# Patient Record
Sex: MALE | Race: WHITE | URBAN - METROPOLITAN AREA
[De-identification: names, ages, dates, MRNs, and addresses within clinical notes are randomized per-mention and may not be internally consistent; named-entity substitution may affect disease eponyms.]

---

## 2023-04-26 ENCOUNTER — TELEPHONE (OUTPATIENT)
Dept: PRIMARY CARE | Facility: CLINIC | Age: 68
End: 2023-04-26
Payer: MEDICARE

## 2023-06-18 DIAGNOSIS — E78.5 DYSLIPIDEMIA: Primary | ICD-10-CM

## 2023-06-19 RX ORDER — ATORVASTATIN CALCIUM 20 MG/1
TABLET, FILM COATED ORAL
Qty: 90 TABLET | Refills: 0 | Status: SHIPPED | OUTPATIENT
Start: 2023-06-19 | End: 2023-07-31 | Stop reason: SDUPTHER

## 2023-07-14 PROBLEM — D72.819 LEUKOPENIA: Status: ACTIVE | Noted: 2023-07-14

## 2023-07-14 PROBLEM — D22.9 MULTIPLE NEVI: Status: ACTIVE | Noted: 2023-07-14

## 2023-07-14 PROBLEM — J01.90 ACUTE SINUSITIS: Status: ACTIVE | Noted: 2023-07-14

## 2023-07-14 PROBLEM — K50.00 ILEITIS, TERMINAL (MULTI): Status: ACTIVE | Noted: 2023-07-14

## 2023-07-14 PROBLEM — E78.00 ELEVATED LDL CHOLESTEROL LEVEL: Status: ACTIVE | Noted: 2023-07-14

## 2023-07-14 PROBLEM — M54.50 ACUTE LEFT-SIDED LOW BACK PAIN WITHOUT SCIATICA: Status: ACTIVE | Noted: 2023-07-14

## 2023-07-14 PROBLEM — L25.9 CONTACT DERMATITIS: Status: ACTIVE | Noted: 2023-07-14

## 2023-07-14 PROBLEM — E78.5 DYSLIPIDEMIA: Status: ACTIVE | Noted: 2023-07-14

## 2023-07-14 PROBLEM — R10.32 COLICKY LLQ ABDOMINAL PAIN: Status: ACTIVE | Noted: 2023-07-14

## 2023-07-14 PROBLEM — R53.83 OTHER FATIGUE: Status: ACTIVE | Noted: 2023-07-14

## 2023-07-14 PROBLEM — K59.00 CONSTIPATION: Status: ACTIVE | Noted: 2023-07-14

## 2023-07-14 PROBLEM — M54.31 SCIATICA OF RIGHT SIDE: Status: ACTIVE | Noted: 2023-07-14

## 2023-07-14 PROBLEM — R73.01 IFG (IMPAIRED FASTING GLUCOSE): Status: ACTIVE | Noted: 2023-07-14

## 2023-07-14 PROBLEM — K52.9 ILEITIS: Status: ACTIVE | Noted: 2023-07-14

## 2023-07-14 PROBLEM — K42.9 UMBILICAL HERNIA: Status: ACTIVE | Noted: 2023-07-14

## 2023-07-14 RX ORDER — TADALAFIL 20 MG/1
TABLET ORAL
COMMUNITY
Start: 2023-02-22 | End: 2023-07-31 | Stop reason: SDUPTHER

## 2023-07-18 ENCOUNTER — OFFICE VISIT (OUTPATIENT)
Dept: PRIMARY CARE | Facility: CLINIC | Age: 68
End: 2023-07-18
Payer: MEDICARE

## 2023-07-18 VITALS
BODY MASS INDEX: 24.92 KG/M2 | HEART RATE: 68 BPM | OXYGEN SATURATION: 99 % | WEIGHT: 178 LBS | RESPIRATION RATE: 12 BRPM | HEIGHT: 71 IN | DIASTOLIC BLOOD PRESSURE: 84 MMHG | SYSTOLIC BLOOD PRESSURE: 124 MMHG

## 2023-07-18 DIAGNOSIS — L24.7 IRRITANT CONTACT DERMATITIS DUE TO PLANTS, EXCEPT FOOD: Primary | ICD-10-CM

## 2023-07-18 PROCEDURE — 99213 OFFICE O/P EST LOW 20 MIN: CPT | Performed by: FAMILY MEDICINE

## 2023-07-18 PROCEDURE — 1159F MED LIST DOCD IN RCRD: CPT | Performed by: FAMILY MEDICINE

## 2023-07-18 PROCEDURE — 1036F TOBACCO NON-USER: CPT | Performed by: FAMILY MEDICINE

## 2023-07-18 PROCEDURE — 96372 THER/PROPH/DIAG INJ SC/IM: CPT | Performed by: FAMILY MEDICINE

## 2023-07-18 RX ORDER — METHYLPREDNISOLONE ACETATE 40 MG/ML
40 INJECTION, SUSPENSION INTRA-ARTICULAR; INTRALESIONAL; INTRAMUSCULAR; SOFT TISSUE ONCE
Status: DISCONTINUED | OUTPATIENT
Start: 2023-07-18 | End: 2023-07-18

## 2023-07-18 RX ORDER — METHYLPREDNISOLONE 4 MG/1
TABLET ORAL
Qty: 21 TABLET | Refills: 0 | Status: SHIPPED | OUTPATIENT
Start: 2023-07-18 | End: 2023-07-25

## 2023-07-18 RX ORDER — METHYLPREDNISOLONE ACETATE 80 MG/ML
80 INJECTION, SUSPENSION INTRA-ARTICULAR; INTRALESIONAL; INTRAMUSCULAR; SOFT TISSUE ONCE
Status: COMPLETED | OUTPATIENT
Start: 2023-07-18 | End: 2023-07-18

## 2023-07-18 RX ADMIN — METHYLPREDNISOLONE ACETATE 40 MG: 80 INJECTION, SUSPENSION INTRA-ARTICULAR; INTRALESIONAL; INTRAMUSCULAR; SOFT TISSUE at 15:09

## 2023-07-18 ASSESSMENT — ENCOUNTER SYMPTOMS
DEPRESSION: 0
SORE THROAT: 0
DYSURIA: 0
COUGH: 0
HEADACHES: 0
ABDOMINAL DISTENTION: 0
EYE REDNESS: 0
EYE PAIN: 0
OCCASIONAL FEELINGS OF UNSTEADINESS: 0
SHORTNESS OF BREATH: 0
ADENOPATHY: 0
CONSTIPATION: 0
WEAKNESS: 0
NERVOUS/ANXIOUS: 0
CHILLS: 0
BACK PAIN: 0
FEVER: 0
DIZZINESS: 0
DYSPHORIC MOOD: 0
DIARRHEA: 0
FATIGUE: 0
BLOOD IN STOOL: 0
ABDOMINAL PAIN: 0
CHEST TIGHTNESS: 0
APPETITE CHANGE: 0
ARTHRALGIAS: 0
LOSS OF SENSATION IN FEET: 0
BRUISES/BLEEDS EASILY: 0
DIFFICULTY URINATING: 0

## 2023-07-18 NOTE — PROGRESS NOTES
"Subjective   Patient ID: Zacarias Holbrook is a 68 y.o. male who presents for Rash.    HPI     Review of Systems    Objective   /84   Pulse 68   Resp 12   Ht 1.791 m (5' 10.5\")   Wt 80.7 kg (178 lb)   SpO2 99%   BMI 25.18 kg/m²     Physical Exam    Assessment/Plan          "

## 2023-07-18 NOTE — PROGRESS NOTES
"Subjective   Patient ID: Zacarias Holbrook is a 68 y.o. male who presents for Rash.  Pt has itchy patchy raised rash for about 5 days after working outside and thinks he may have had contact with poison ivy . Started on arms and has spread to trunk and legs. Pt reports symptoms are worsening.   Pt has tried Calamine lotion for treatment with some improvement.     Rash  Pertinent negatives include no congestion, cough, diarrhea, eye pain, fatigue, fever, shortness of breath or sore throat.       Review of Systems   Constitutional:  Negative for appetite change, chills, fatigue and fever.   HENT:  Negative for congestion, hearing loss and sore throat.    Eyes:  Negative for pain, redness and visual disturbance.   Respiratory:  Negative for cough, chest tightness and shortness of breath.    Cardiovascular:  Negative for chest pain and leg swelling.   Gastrointestinal:  Negative for abdominal distention, abdominal pain, blood in stool, constipation and diarrhea.   Genitourinary:  Negative for difficulty urinating and dysuria.   Musculoskeletal:  Negative for arthralgias and back pain.   Skin:  Positive for rash.   Neurological:  Negative for dizziness, weakness and headaches.   Hematological:  Negative for adenopathy. Does not bruise/bleed easily.   Psychiatric/Behavioral:  Negative for dysphoric mood. The patient is not nervous/anxious.        Objective   /84   Pulse 68   Resp 12   Ht 1.791 m (5' 10.5\")   Wt 80.7 kg (178 lb)   SpO2 99%   BMI 25.18 kg/m²    Physical Exam  Constitutional:       General: He is not in acute distress.     Appearance: Normal appearance.   Cardiovascular:      Rate and Rhythm: Normal rate and regular rhythm.      Heart sounds: Normal heart sounds. No murmur heard.  Pulmonary:      Effort: Pulmonary effort is normal.      Breath sounds: Normal breath sounds.   Abdominal:      Palpations: Abdomen is soft.      Tenderness: There is no abdominal tenderness.   Skin:     Findings: Rash " (scattered gabriela raised pink rash on arms, legs and R chest, +excoriations) present.   Neurological:      Mental Status: He is alert.   Psychiatric:         Mood and Affect: Mood normal.         Judgment: Judgment normal.           Assessment/Plan     Contract dermatitis - with diffuse coverage will treat with steroid shot and Medrol dose lucas starting tomorrow. Continue Benadryl and Calamine lotion for itchiness as needed  Follow up as planned

## 2023-07-26 ENCOUNTER — TELEPHONE (OUTPATIENT)
Dept: PRIMARY CARE | Facility: CLINIC | Age: 68
End: 2023-07-26
Payer: MEDICARE

## 2023-07-27 DIAGNOSIS — E78.5 DYSLIPIDEMIA: Primary | ICD-10-CM

## 2023-07-28 ENCOUNTER — LAB (OUTPATIENT)
Dept: LAB | Facility: LAB | Age: 68
End: 2023-07-28
Payer: MEDICARE

## 2023-07-28 DIAGNOSIS — E78.5 DYSLIPIDEMIA: ICD-10-CM

## 2023-07-28 LAB
ALANINE AMINOTRANSFERASE (SGPT) (U/L) IN SER/PLAS: 15 U/L (ref 10–52)
ALBUMIN (G/DL) IN SER/PLAS: 4.5 G/DL (ref 3.4–5)
ALKALINE PHOSPHATASE (U/L) IN SER/PLAS: 66 U/L (ref 33–136)
ANION GAP IN SER/PLAS: 12 MMOL/L (ref 10–20)
ASPARTATE AMINOTRANSFERASE (SGOT) (U/L) IN SER/PLAS: 15 U/L (ref 9–39)
BILIRUBIN TOTAL (MG/DL) IN SER/PLAS: 0.7 MG/DL (ref 0–1.2)
CALCIUM (MG/DL) IN SER/PLAS: 9.6 MG/DL (ref 8.6–10.6)
CARBON DIOXIDE, TOTAL (MMOL/L) IN SER/PLAS: 30 MMOL/L (ref 21–32)
CHLORIDE (MMOL/L) IN SER/PLAS: 104 MMOL/L (ref 98–107)
CHOLESTEROL (MG/DL) IN SER/PLAS: 166 MG/DL (ref 0–199)
CHOLESTEROL IN HDL (MG/DL) IN SER/PLAS: 69.8 MG/DL
CHOLESTEROL/HDL RATIO: 2.4
CREATININE (MG/DL) IN SER/PLAS: 0.92 MG/DL (ref 0.5–1.3)
GFR MALE: >90 ML/MIN/1.73M2
GLUCOSE (MG/DL) IN SER/PLAS: 85 MG/DL (ref 74–99)
LDL: 80 MG/DL (ref 0–99)
POTASSIUM (MMOL/L) IN SER/PLAS: 4.4 MMOL/L (ref 3.5–5.3)
PROTEIN TOTAL: 6.5 G/DL (ref 6.4–8.2)
SODIUM (MMOL/L) IN SER/PLAS: 142 MMOL/L (ref 136–145)
TRIGLYCERIDE (MG/DL) IN SER/PLAS: 80 MG/DL (ref 0–149)
UREA NITROGEN (MG/DL) IN SER/PLAS: 16 MG/DL (ref 6–23)
VLDL: 16 MG/DL (ref 0–40)

## 2023-07-28 PROCEDURE — 36415 COLL VENOUS BLD VENIPUNCTURE: CPT

## 2023-07-28 PROCEDURE — 80053 COMPREHEN METABOLIC PANEL: CPT

## 2023-07-28 PROCEDURE — 80061 LIPID PANEL: CPT

## 2023-07-31 ENCOUNTER — OFFICE VISIT (OUTPATIENT)
Dept: PRIMARY CARE | Facility: CLINIC | Age: 68
End: 2023-07-31
Payer: MEDICARE

## 2023-07-31 ENCOUNTER — TELEPHONE (OUTPATIENT)
Dept: PRIMARY CARE | Facility: CLINIC | Age: 68
End: 2023-07-31

## 2023-07-31 VITALS
RESPIRATION RATE: 12 BRPM | HEART RATE: 72 BPM | HEIGHT: 71 IN | DIASTOLIC BLOOD PRESSURE: 74 MMHG | SYSTOLIC BLOOD PRESSURE: 122 MMHG | BODY MASS INDEX: 24.92 KG/M2 | OXYGEN SATURATION: 97 % | WEIGHT: 178 LBS

## 2023-07-31 DIAGNOSIS — L24.7 IRRITANT CONTACT DERMATITIS DUE TO PLANTS, EXCEPT FOOD: ICD-10-CM

## 2023-07-31 DIAGNOSIS — R73.01 IFG (IMPAIRED FASTING GLUCOSE): ICD-10-CM

## 2023-07-31 DIAGNOSIS — E78.5 DYSLIPIDEMIA: Primary | ICD-10-CM

## 2023-07-31 DIAGNOSIS — N52.9 ERECTILE DYSFUNCTION, UNSPECIFIED ERECTILE DYSFUNCTION TYPE: ICD-10-CM

## 2023-07-31 DIAGNOSIS — Z00.00 HEALTH CARE MAINTENANCE: ICD-10-CM

## 2023-07-31 DIAGNOSIS — L84 CALLUS OF FOOT: ICD-10-CM

## 2023-07-31 PROBLEM — M54.50 ACUTE LEFT-SIDED LOW BACK PAIN WITHOUT SCIATICA: Status: RESOLVED | Noted: 2023-07-14 | Resolved: 2023-07-31

## 2023-07-31 PROBLEM — R20.2 PARESTHESIAS: Status: ACTIVE | Noted: 2023-07-31

## 2023-07-31 PROBLEM — K59.00 CONSTIPATION: Status: RESOLVED | Noted: 2023-07-14 | Resolved: 2023-07-31

## 2023-07-31 PROBLEM — N48.6 PEYRONIE DISEASE: Status: ACTIVE | Noted: 2023-07-31

## 2023-07-31 PROBLEM — J01.90 ACUTE SINUSITIS: Status: RESOLVED | Noted: 2023-07-14 | Resolved: 2023-07-31

## 2023-07-31 PROBLEM — R10.32 COLICKY LLQ ABDOMINAL PAIN: Status: RESOLVED | Noted: 2023-07-14 | Resolved: 2023-07-31

## 2023-07-31 PROBLEM — E78.00 ELEVATED LDL CHOLESTEROL LEVEL: Status: RESOLVED | Noted: 2023-07-14 | Resolved: 2023-07-31

## 2023-07-31 PROCEDURE — 99213 OFFICE O/P EST LOW 20 MIN: CPT | Performed by: FAMILY MEDICINE

## 2023-07-31 PROCEDURE — 1159F MED LIST DOCD IN RCRD: CPT | Performed by: FAMILY MEDICINE

## 2023-07-31 PROCEDURE — 1036F TOBACCO NON-USER: CPT | Performed by: FAMILY MEDICINE

## 2023-07-31 RX ORDER — ATORVASTATIN CALCIUM 20 MG/1
20 TABLET, FILM COATED ORAL DAILY
Qty: 90 TABLET | Refills: 1 | Status: SHIPPED | OUTPATIENT
Start: 2023-07-31 | End: 2024-02-02 | Stop reason: SDUPTHER

## 2023-07-31 RX ORDER — TADALAFIL 20 MG/1
20 TABLET ORAL DAILY PRN
Qty: 30 TABLET | Refills: 1 | Status: SHIPPED | OUTPATIENT
Start: 2023-07-31

## 2023-07-31 RX ORDER — TRIAMCINOLONE ACETONIDE 1 MG/G
CREAM TOPICAL 2 TIMES DAILY
Qty: 30 G | Refills: 0 | Status: SHIPPED | OUTPATIENT
Start: 2023-07-31 | End: 2024-02-02 | Stop reason: WASHOUT

## 2023-07-31 ASSESSMENT — ENCOUNTER SYMPTOMS
DYSPHORIC MOOD: 0
NERVOUS/ANXIOUS: 0
DYSURIA: 0
BLOOD IN STOOL: 0
SHORTNESS OF BREATH: 0
BACK PAIN: 0
ABDOMINAL PAIN: 0
ABDOMINAL DISTENTION: 0
FATIGUE: 0
DIFFICULTY URINATING: 0
EYE REDNESS: 0
APPETITE CHANGE: 0
DIARRHEA: 0
HEADACHES: 0
ARTHRALGIAS: 0
CHEST TIGHTNESS: 0
SORE THROAT: 0
ADENOPATHY: 0
DIZZINESS: 0
CONSTIPATION: 0
COUGH: 0
EYE PAIN: 0
WEAKNESS: 0
BRUISES/BLEEDS EASILY: 0
CHILLS: 0
FEVER: 0

## 2023-07-31 NOTE — PROGRESS NOTES
"Subjective   Patient ID: Zacarias Holbrook is a 68 y.o. male who presents for Follow-up.    HPI     Review of Systems    Objective   /74   Pulse 72   Resp 12   Ht 1.791 m (5' 10.5\")   Wt 80.7 kg (178 lb)   SpO2 97%   BMI 25.18 kg/m²     Physical Exam    Assessment/Plan          "

## 2023-07-31 NOTE — PROGRESS NOTES
"Subjective   Patient ID: Zacarias Holbrook is a 68 y.o. male who presents for Follow-up.  Pt has Dyslipidemia.   Lipid panel showed LDL in good range.  Currently taking Atorvastatin and is tolerating well without muscle pains or weakness.   Exercising 6 days per week. Usually eating healthy    He still has poison ivy dermatitis on arms and hands. He finished Medrol dose lucas.     He brings up L lateral plantar foot tenderness with walking.        Review of Systems   Constitutional:  Negative for appetite change, chills, fatigue and fever.   HENT:  Negative for congestion, hearing loss and sore throat.    Eyes:  Negative for pain, redness and visual disturbance.   Respiratory:  Negative for cough, chest tightness and shortness of breath.    Cardiovascular:  Negative for chest pain and leg swelling.   Gastrointestinal:  Negative for abdominal distention, abdominal pain, blood in stool, constipation and diarrhea.   Genitourinary:  Negative for difficulty urinating and dysuria.   Musculoskeletal:  Negative for arthralgias and back pain.   Skin:  Negative for rash.   Neurological:  Negative for dizziness, weakness and headaches.   Hematological:  Negative for adenopathy. Does not bruise/bleed easily.   Psychiatric/Behavioral:  Negative for dysphoric mood. The patient is not nervous/anxious.        Objective   /74   Pulse 72   Resp 12   Ht 1.791 m (5' 10.5\")   Wt 80.7 kg (178 lb)   SpO2 97%   BMI 25.18 kg/m²    Physical Exam  Constitutional:       General: He is not in acute distress.     Appearance: Normal appearance.   Cardiovascular:      Rate and Rhythm: Normal rate and regular rhythm.      Heart sounds: Normal heart sounds. No murmur heard.  Pulmonary:      Effort: Pulmonary effort is normal.      Breath sounds: Normal breath sounds.   Abdominal:      Palpations: Abdomen is soft.      Tenderness: There is no abdominal tenderness.   Skin:     Comments: L lateral plantar foot has mildly tender callus. L hand R " upper arm have scattered maculopapular rash.   Neurological:      Mental Status: He is alert.   Psychiatric:         Mood and Affect: Mood normal.         Judgment: Judgment normal.           Assessment/Plan   Cholesterol - doing well on statin, continue low cholesterol diet and regular exercise. We will continue to monitor with routine labs   IFG - fasting glucose improved , monitor  Poison Ivy dermatitis- giving Kenalog cream to use as needed  Callus on L foot - discussed soaking and filing.  Follow up in 6months, 30min for physical

## 2023-08-04 DIAGNOSIS — L03.119 CELLULITIS OF LOWER EXTREMITY, UNSPECIFIED LATERALITY: Primary | ICD-10-CM

## 2023-08-04 RX ORDER — SULFAMETHOXAZOLE AND TRIMETHOPRIM 800; 160 MG/1; MG/1
1 TABLET ORAL 2 TIMES DAILY
Qty: 20 TABLET | Refills: 0 | Status: SHIPPED | OUTPATIENT
Start: 2023-08-04 | End: 2023-08-14

## 2023-12-09 DIAGNOSIS — E78.5 DYSLIPIDEMIA: ICD-10-CM

## 2023-12-29 RX ORDER — ATORVASTATIN CALCIUM 20 MG/1
20 TABLET, FILM COATED ORAL DAILY
Qty: 100 TABLET | Refills: 2 | OUTPATIENT
Start: 2023-12-29

## 2024-01-24 ENCOUNTER — LAB (OUTPATIENT)
Dept: LAB | Facility: LAB | Age: 69
End: 2024-01-24
Payer: MEDICARE

## 2024-01-24 DIAGNOSIS — Z00.00 HEALTH CARE MAINTENANCE: ICD-10-CM

## 2024-01-24 DIAGNOSIS — E78.5 DYSLIPIDEMIA: ICD-10-CM

## 2024-01-24 DIAGNOSIS — R73.01 IFG (IMPAIRED FASTING GLUCOSE): ICD-10-CM

## 2024-01-24 LAB
ALBUMIN SERPL BCP-MCNC: 4 G/DL (ref 3.4–5)
ALP SERPL-CCNC: 63 U/L (ref 33–136)
ALT SERPL W P-5'-P-CCNC: 22 U/L (ref 10–52)
ANION GAP SERPL CALC-SCNC: 11 MMOL/L (ref 10–20)
APPEARANCE UR: ABNORMAL
AST SERPL W P-5'-P-CCNC: 20 U/L (ref 9–39)
BILIRUB SERPL-MCNC: 0.6 MG/DL (ref 0–1.2)
BILIRUB UR STRIP.AUTO-MCNC: NEGATIVE MG/DL
BUN SERPL-MCNC: 15 MG/DL (ref 6–23)
CALCIUM SERPL-MCNC: 9.1 MG/DL (ref 8.6–10.6)
CHLORIDE SERPL-SCNC: 104 MMOL/L (ref 98–107)
CHOLEST SERPL-MCNC: 141 MG/DL (ref 0–199)
CHOLESTEROL/HDL RATIO: 2.7
CO2 SERPL-SCNC: 29 MMOL/L (ref 21–32)
COLOR UR: ABNORMAL
CREAT SERPL-MCNC: 1.01 MG/DL (ref 0.5–1.3)
EGFRCR SERPLBLD CKD-EPI 2021: 82 ML/MIN/1.73M*2
ERYTHROCYTE [DISTWIDTH] IN BLOOD BY AUTOMATED COUNT: 12.4 % (ref 11.5–14.5)
GLUCOSE SERPL-MCNC: 82 MG/DL (ref 74–99)
GLUCOSE UR STRIP.AUTO-MCNC: NEGATIVE MG/DL
HCT VFR BLD AUTO: 41.3 % (ref 41–52)
HCV AB SER QL: NONREACTIVE
HDLC SERPL-MCNC: 51.9 MG/DL
HGB BLD-MCNC: 13.7 G/DL (ref 13.5–17.5)
KETONES UR STRIP.AUTO-MCNC: NEGATIVE MG/DL
LDLC SERPL CALC-MCNC: 70 MG/DL
LEUKOCYTE ESTERASE UR QL STRIP.AUTO: NEGATIVE
MCH RBC QN AUTO: 30.4 PG (ref 26–34)
MCHC RBC AUTO-ENTMCNC: 33.2 G/DL (ref 32–36)
MCV RBC AUTO: 92 FL (ref 80–100)
NITRITE UR QL STRIP.AUTO: NEGATIVE
NON HDL CHOLESTEROL: 89 MG/DL (ref 0–149)
NRBC BLD-RTO: 0 /100 WBCS (ref 0–0)
PH UR STRIP.AUTO: 5 [PH]
PLATELET # BLD AUTO: 239 X10*3/UL (ref 150–450)
POTASSIUM SERPL-SCNC: 4.4 MMOL/L (ref 3.5–5.3)
PROT SERPL-MCNC: 6 G/DL (ref 6.4–8.2)
PROT UR STRIP.AUTO-MCNC: NEGATIVE MG/DL
PSA SERPL-MCNC: 2.73 NG/ML
RBC # BLD AUTO: 4.51 X10*6/UL (ref 4.5–5.9)
RBC # UR STRIP.AUTO: NEGATIVE /UL
SODIUM SERPL-SCNC: 140 MMOL/L (ref 136–145)
SP GR UR STRIP.AUTO: 1.02
TRIGL SERPL-MCNC: 94 MG/DL (ref 0–149)
UROBILINOGEN UR STRIP.AUTO-MCNC: <2 MG/DL
VLDL: 19 MG/DL (ref 0–40)
WBC # BLD AUTO: 3.3 X10*3/UL (ref 4.4–11.3)

## 2024-01-24 PROCEDURE — 85027 COMPLETE CBC AUTOMATED: CPT

## 2024-01-24 PROCEDURE — 81003 URINALYSIS AUTO W/O SCOPE: CPT

## 2024-01-24 PROCEDURE — 80053 COMPREHEN METABOLIC PANEL: CPT

## 2024-01-24 PROCEDURE — 84153 ASSAY OF PSA TOTAL: CPT

## 2024-01-24 PROCEDURE — 86803 HEPATITIS C AB TEST: CPT

## 2024-01-24 PROCEDURE — 36415 COLL VENOUS BLD VENIPUNCTURE: CPT

## 2024-01-24 PROCEDURE — 80061 LIPID PANEL: CPT

## 2024-01-29 DIAGNOSIS — Z00.00 ROUTINE GENERAL MEDICAL EXAMINATION AT A HEALTH CARE FACILITY: Primary | ICD-10-CM

## 2024-02-02 ENCOUNTER — OFFICE VISIT (OUTPATIENT)
Dept: PRIMARY CARE | Facility: CLINIC | Age: 69
End: 2024-02-02
Payer: MEDICARE

## 2024-02-02 VITALS
WEIGHT: 177 LBS | SYSTOLIC BLOOD PRESSURE: 122 MMHG | DIASTOLIC BLOOD PRESSURE: 78 MMHG | HEIGHT: 70 IN | BODY MASS INDEX: 25.34 KG/M2 | HEART RATE: 72 BPM | OXYGEN SATURATION: 97 % | RESPIRATION RATE: 12 BRPM

## 2024-02-02 DIAGNOSIS — D72.819 LEUKOPENIA, UNSPECIFIED TYPE: ICD-10-CM

## 2024-02-02 DIAGNOSIS — C44.91 RECURRENT BASAL CELL CARCINOMA: ICD-10-CM

## 2024-02-02 DIAGNOSIS — Z00.00 HEALTH CARE MAINTENANCE: Primary | ICD-10-CM

## 2024-02-02 DIAGNOSIS — M54.50 CHRONIC LEFT-SIDED LOW BACK PAIN WITHOUT SCIATICA: ICD-10-CM

## 2024-02-02 DIAGNOSIS — E78.5 DYSLIPIDEMIA: ICD-10-CM

## 2024-02-02 DIAGNOSIS — G89.29 CHRONIC LEFT-SIDED LOW BACK PAIN WITHOUT SCIATICA: ICD-10-CM

## 2024-02-02 PROBLEM — L03.119 CELLULITIS OF LOWER EXTREMITY: Status: RESOLVED | Noted: 2023-08-04 | Resolved: 2024-02-02

## 2024-02-02 PROBLEM — K50.00 ILEITIS, TERMINAL (MULTI): Status: RESOLVED | Noted: 2023-07-14 | Resolved: 2024-02-02

## 2024-02-02 PROBLEM — L25.9 CONTACT DERMATITIS: Status: RESOLVED | Noted: 2023-07-14 | Resolved: 2024-02-02

## 2024-02-02 PROCEDURE — 1160F RVW MEDS BY RX/DR IN RCRD: CPT | Performed by: FAMILY MEDICINE

## 2024-02-02 PROCEDURE — 1159F MED LIST DOCD IN RCRD: CPT | Performed by: FAMILY MEDICINE

## 2024-02-02 PROCEDURE — 1170F FXNL STATUS ASSESSED: CPT | Performed by: FAMILY MEDICINE

## 2024-02-02 PROCEDURE — G0439 PPPS, SUBSEQ VISIT: HCPCS | Performed by: FAMILY MEDICINE

## 2024-02-02 PROCEDURE — 1123F ACP DISCUSS/DSCN MKR DOCD: CPT | Performed by: FAMILY MEDICINE

## 2024-02-02 PROCEDURE — 1036F TOBACCO NON-USER: CPT | Performed by: FAMILY MEDICINE

## 2024-02-02 RX ORDER — ATORVASTATIN CALCIUM 20 MG/1
20 TABLET, FILM COATED ORAL DAILY
Qty: 90 TABLET | Refills: 1 | Status: SHIPPED | OUTPATIENT
Start: 2024-02-02 | End: 2024-06-01

## 2024-02-02 ASSESSMENT — ENCOUNTER SYMPTOMS
DYSPHORIC MOOD: 0
EYE REDNESS: 0
BACK PAIN: 1
LOSS OF SENSATION IN FEET: 0
CHILLS: 0
FEVER: 0
HEADACHES: 0
ABDOMINAL PAIN: 0
CONSTIPATION: 0
CHEST TIGHTNESS: 0
DIARRHEA: 0
EYE PAIN: 0
ABDOMINAL DISTENTION: 0
WEAKNESS: 0
OCCASIONAL FEELINGS OF UNSTEADINESS: 0
DEPRESSION: 0
APPETITE CHANGE: 0
NERVOUS/ANXIOUS: 0
DYSURIA: 0
DIZZINESS: 0
COUGH: 0
FATIGUE: 0
SORE THROAT: 0
BRUISES/BLEEDS EASILY: 0
BLOOD IN STOOL: 0
ARTHRALGIAS: 0
SHORTNESS OF BREATH: 0
ADENOPATHY: 0
DIFFICULTY URINATING: 0

## 2024-02-02 ASSESSMENT — ACTIVITIES OF DAILY LIVING (ADL)
DOING_HOUSEWORK: INDEPENDENT
MANAGING_FINANCES: INDEPENDENT
TAKING_MEDICATION: INDEPENDENT
BATHING: INDEPENDENT
DRESSING: INDEPENDENT
GROCERY_SHOPPING: INDEPENDENT

## 2024-02-02 ASSESSMENT — PATIENT HEALTH QUESTIONNAIRE - PHQ9
2. FEELING DOWN, DEPRESSED OR HOPELESS: NOT AT ALL
1. LITTLE INTEREST OR PLEASURE IN DOING THINGS: NOT AT ALL
SUM OF ALL RESPONSES TO PHQ9 QUESTIONS 1 AND 2: 0

## 2024-02-02 NOTE — PROGRESS NOTES
Subjective   Patient ID: Zacarias Holbrook is a 69 y.o. male who presents for Medicare Annual Wellness Visit Subsequent.  PMHX, PSHx, Fam hx, and Social hx reviewed.   New concerns  - Abcscess improved from infection from Sept but redness in the area remains. Not improving.  Vaccines Flu. Pnemonia, and Shingrix. He has hx BCC, follows with dermatology. He does have new raised round ~4mm skin lesion with erythematous base on R dorsal wrist.  Labs showed mildly low WBC.   Dentist seen at least yearly yes  Vision concerns none  Hearing concerns none  Diet is overall healthy.   Smoker - no  Alcohol use - 2-4 drinks per week  Exercising 6 days per week.   Sexually active - yes, using Tadalafil  Colonoscopy 2022     Pt has Dyslipidemia.   Lipid panel showed LDL 70.  Currently taking lipitor and is tolerating well without muscle pains or weakness.         Review of Systems   Constitutional:  Negative for appetite change, chills, fatigue and fever.   HENT:  Negative for congestion, hearing loss and sore throat.    Eyes:  Negative for pain, redness and visual disturbance.   Respiratory:  Negative for cough, chest tightness and shortness of breath.    Cardiovascular:  Negative for chest pain and leg swelling.   Gastrointestinal:  Negative for abdominal distention, abdominal pain, blood in stool, constipation and diarrhea.   Genitourinary:  Negative for difficulty urinating and dysuria.   Musculoskeletal:  Positive for back pain (L lower back pain, nonradiating. Onset about 6 months ago.). Negative for arthralgias.   Skin:  Negative for rash.   Neurological:  Negative for dizziness, weakness and headaches.   Hematological:  Negative for adenopathy. Does not bruise/bleed easily.   Psychiatric/Behavioral:  Negative for dysphoric mood. The patient is not nervous/anxious.    Medicare Wellness Billing Compliance Satisfied    *This is a visual tool to show completion of required items on the day of the visit. Green checks will only appear  "on the date of visit.    Review all medications by prescribing practitioner or clinical pharmacist (such as prescriptions, OTCs, herbal therapies and supplements) documented in the medical record    Past Medical, Surgical, and Family History reviewed and updated in chart    Tobacco Use Reviewed    Alcohol Use Reviewed    Illicit Drug Use Reviewed    PHQ2/9    Falls in Last Year Reviewed    Home Safety Risk Factors Reviewed    Cognitive Impairment Reviewed    Patient Self Assessment and Health Status    Current Diet Reviewed    Exercise Frequency    ADL - Hearing Impairment    ADL - Bathing    ADL - Dressing    ADL - Walks in Home    IADL - Managing Finances    IADL - Grocery Shopping    IADL - Taking Medications    IADL - Doing Housework        Objective   /78   Pulse 72   Resp 12   Ht 1.778 m (5' 10\")   Wt 80.3 kg (177 lb)   SpO2 97%   BMI 25.40 kg/m²    Physical Exam  Constitutional:       General: He is not in acute distress.     Appearance: Normal appearance. He is not ill-appearing.   HENT:      Head: Normocephalic and atraumatic.      Right Ear: Tympanic membrane, ear canal and external ear normal.      Left Ear: Tympanic membrane, ear canal and external ear normal.      Nose: Nose normal.      Mouth/Throat:      Mouth: Mucous membranes are moist.      Pharynx: No oropharyngeal exudate or posterior oropharyngeal erythema.   Eyes:      Extraocular Movements: Extraocular movements intact.      Conjunctiva/sclera: Conjunctivae normal.      Pupils: Pupils are equal, round, and reactive to light.   Neck:      Vascular: No carotid bruit.   Cardiovascular:      Rate and Rhythm: Normal rate and regular rhythm.      Heart sounds: Normal heart sounds. No murmur heard.  Pulmonary:      Breath sounds: Normal breath sounds. No wheezing, rhonchi or rales.   Abdominal:      General: Bowel sounds are normal. There is no distension.      Palpations: Abdomen is soft. There is no mass.      " Tenderness: There is no abdominal tenderness.   Musculoskeletal:         General: No swelling or deformity.      Cervical back: Neck supple. No tenderness.   Lymphadenopathy:      Cervical: No cervical adenopathy.   Skin:     General: Skin is warm and dry.      Findings: No lesion or rash.   Neurological:      Mental Status: He is alert and oriented to person, place, and time.      Sensory: No sensory deficit.      Motor: No weakness.      Coordination: Coordination normal.      Deep Tendon Reflexes: Reflexes normal.   Psychiatric:         Mood and Affect: Mood normal.         Behavior: Behavior normal.         Judgment: Judgment normal.     Assessment/Plan   Diagnoses and all orders for this visit:  Health care maintenance - Flu Shingrix and Pneumonia vaccines recommended. Labs reviewed and discussed. Colonoscopy current. Hx ileitis, to monitor at 3-5yrs.  Dyslipidemia - doing well on statin, checking CAC score.  Chronic left-sided low back pain - try Yoga back stretches, monitor and consider XR/PT if ongoing  Recurrent basal cell carcinoma/ new skin lesion on L wrist - recommend follow up with dermatology  Low WBC - monitor with labs.     Follow up in 6months, 15min

## 2024-02-02 NOTE — PATIENT INSTRUCTIONS

## 2024-02-02 NOTE — PROGRESS NOTES
"Subjective   Reason for Visit: Zacarias Holbrook is an 69 y.o. male here for a Medicare Wellness visit.     Past Medical, Surgical, and Family History reviewed and updated in chart.    Reviewed all medications by prescribing practitioner or clinical pharmacist (such as prescriptions, OTCs, herbal therapies and supplements) and documented in the medical record.    HPI    Patient Care Team:  Henrry Womack MD as PCP - General  Henrry Womack MD as PCP - [employer] PCP     Review of Systems    Objective   Vitals:  /78   Pulse 72   Resp 12   Ht 1.778 m (5' 10\")   Wt 80.3 kg (177 lb)   SpO2 97%   BMI 25.40 kg/m²       Physical Exam    Assessment/Plan   Problem List Items Addressed This Visit    None         "

## 2024-02-05 PROBLEM — Z00.00 ROUTINE GENERAL MEDICAL EXAMINATION AT A HEALTH CARE FACILITY: Status: ACTIVE | Noted: 2024-02-05

## 2024-02-06 ENCOUNTER — HOSPITAL ENCOUNTER (OUTPATIENT)
Dept: RADIOLOGY | Facility: CLINIC | Age: 69
Discharge: HOME | End: 2024-02-06
Payer: MEDICARE

## 2024-02-06 DIAGNOSIS — R93.1 AGATSTON CAC SCORE 100-199: Primary | ICD-10-CM

## 2024-02-06 DIAGNOSIS — I71.21 ANEURYSM OF ASCENDING AORTA WITHOUT RUPTURE (CMS-HCC): ICD-10-CM

## 2024-02-06 DIAGNOSIS — Z00.00 ROUTINE GENERAL MEDICAL EXAMINATION AT A HEALTH CARE FACILITY: ICD-10-CM

## 2024-02-06 PROCEDURE — 75571 CT HRT W/O DYE W/CA TEST: CPT

## 2024-05-29 DIAGNOSIS — E78.5 DYSLIPIDEMIA: ICD-10-CM

## 2024-06-01 RX ORDER — ATORVASTATIN CALCIUM 20 MG/1
20 TABLET, FILM COATED ORAL DAILY
Qty: 100 TABLET | Refills: 0 | Status: SHIPPED | OUTPATIENT
Start: 2024-06-01

## 2024-08-05 ENCOUNTER — LAB (OUTPATIENT)
Dept: LAB | Facility: LAB | Age: 69
End: 2024-08-05
Payer: MEDICARE

## 2024-08-05 ENCOUNTER — APPOINTMENT (OUTPATIENT)
Dept: PRIMARY CARE | Facility: CLINIC | Age: 69
End: 2024-08-05
Payer: MEDICARE

## 2024-08-05 DIAGNOSIS — E78.5 DYSLIPIDEMIA: ICD-10-CM

## 2024-08-05 DIAGNOSIS — D72.819 LEUKOPENIA, UNSPECIFIED TYPE: ICD-10-CM

## 2024-08-05 LAB
ALT SERPL W P-5'-P-CCNC: 17 U/L (ref 10–52)
BASOPHILS # BLD AUTO: 0.03 X10*3/UL (ref 0–0.1)
BASOPHILS NFR BLD AUTO: 0.6 %
CHOLEST SERPL-MCNC: 163 MG/DL (ref 0–199)
CHOLESTEROL/HDL RATIO: 2.3
EOSINOPHIL # BLD AUTO: 0.16 X10*3/UL (ref 0–0.7)
EOSINOPHIL NFR BLD AUTO: 3.3 %
ERYTHROCYTE [DISTWIDTH] IN BLOOD BY AUTOMATED COUNT: 11.9 % (ref 11.5–14.5)
HCT VFR BLD AUTO: 42.1 % (ref 41–52)
HDLC SERPL-MCNC: 69.6 MG/DL
HGB BLD-MCNC: 14.2 G/DL (ref 13.5–17.5)
IMM GRANULOCYTES # BLD AUTO: 0 X10*3/UL (ref 0–0.7)
IMM GRANULOCYTES NFR BLD AUTO: 0 % (ref 0–0.9)
LDLC SERPL CALC-MCNC: 76 MG/DL
LYMPHOCYTES # BLD AUTO: 1.75 X10*3/UL (ref 1.2–4.8)
LYMPHOCYTES NFR BLD AUTO: 36.6 %
MCH RBC QN AUTO: 30.6 PG (ref 26–34)
MCHC RBC AUTO-ENTMCNC: 33.7 G/DL (ref 32–36)
MCV RBC AUTO: 91 FL (ref 80–100)
MONOCYTES # BLD AUTO: 0.52 X10*3/UL (ref 0.1–1)
MONOCYTES NFR BLD AUTO: 10.9 %
NEUTROPHILS # BLD AUTO: 2.32 X10*3/UL (ref 1.2–7.7)
NEUTROPHILS NFR BLD AUTO: 48.6 %
NON HDL CHOLESTEROL: 93 MG/DL (ref 0–149)
NRBC BLD-RTO: 0 /100 WBCS (ref 0–0)
PLATELET # BLD AUTO: 239 X10*3/UL (ref 150–450)
RBC # BLD AUTO: 4.64 X10*6/UL (ref 4.5–5.9)
TRIGL SERPL-MCNC: 87 MG/DL (ref 0–149)
VLDL: 17 MG/DL (ref 0–40)
WBC # BLD AUTO: 4.8 X10*3/UL (ref 4.4–11.3)

## 2024-08-05 PROCEDURE — 80061 LIPID PANEL: CPT

## 2024-08-05 PROCEDURE — 85025 COMPLETE CBC W/AUTO DIFF WBC: CPT

## 2024-08-05 PROCEDURE — 84460 ALANINE AMINO (ALT) (SGPT): CPT

## 2024-08-05 PROCEDURE — 36415 COLL VENOUS BLD VENIPUNCTURE: CPT

## 2024-08-07 ENCOUNTER — APPOINTMENT (OUTPATIENT)
Dept: PRIMARY CARE | Facility: CLINIC | Age: 69
End: 2024-08-07
Payer: MEDICARE

## 2024-08-07 DIAGNOSIS — N52.9 ERECTILE DYSFUNCTION, UNSPECIFIED ERECTILE DYSFUNCTION TYPE: ICD-10-CM

## 2024-08-07 DIAGNOSIS — G89.29 CHRONIC RIGHT-SIDED LOW BACK PAIN WITHOUT SCIATICA: ICD-10-CM

## 2024-08-07 DIAGNOSIS — M54.50 CHRONIC RIGHT-SIDED LOW BACK PAIN WITHOUT SCIATICA: ICD-10-CM

## 2024-08-07 DIAGNOSIS — R73.01 IFG (IMPAIRED FASTING GLUCOSE): ICD-10-CM

## 2024-08-07 DIAGNOSIS — I71.21 ANEURYSM OF ASCENDING AORTA WITHOUT RUPTURE (CMS-HCC): ICD-10-CM

## 2024-08-07 DIAGNOSIS — M54.50 CHRONIC LEFT-SIDED LOW BACK PAIN WITHOUT SCIATICA: ICD-10-CM

## 2024-08-07 DIAGNOSIS — G89.29 CHRONIC LEFT-SIDED LOW BACK PAIN WITHOUT SCIATICA: ICD-10-CM

## 2024-08-07 DIAGNOSIS — R73.03 PREDIABETES: ICD-10-CM

## 2024-08-07 DIAGNOSIS — Z13.89 SCREENING FOR BLOOD OR PROTEIN IN URINE: ICD-10-CM

## 2024-08-07 DIAGNOSIS — R93.1 AGATSTON CAC SCORE 100-199: Primary | ICD-10-CM

## 2024-08-07 DIAGNOSIS — S76.212D INGUINAL STRAIN, LEFT, SUBSEQUENT ENCOUNTER: ICD-10-CM

## 2024-08-07 DIAGNOSIS — Z12.5 SCREENING FOR PROSTATE CANCER: ICD-10-CM

## 2024-08-07 DIAGNOSIS — E78.5 DYSLIPIDEMIA: ICD-10-CM

## 2024-08-07 PROBLEM — S76.219A GROIN STRAIN: Status: ACTIVE | Noted: 2024-08-07

## 2024-08-07 PROCEDURE — 1159F MED LIST DOCD IN RCRD: CPT | Performed by: FAMILY MEDICINE

## 2024-08-07 PROCEDURE — 1123F ACP DISCUSS/DSCN MKR DOCD: CPT | Performed by: FAMILY MEDICINE

## 2024-08-07 PROCEDURE — 1036F TOBACCO NON-USER: CPT | Performed by: FAMILY MEDICINE

## 2024-08-07 PROCEDURE — 99213 OFFICE O/P EST LOW 20 MIN: CPT | Performed by: FAMILY MEDICINE

## 2024-08-07 RX ORDER — TADALAFIL 20 MG/1
20 TABLET ORAL DAILY PRN
Qty: 30 TABLET | Refills: 1 | Status: SHIPPED | OUTPATIENT
Start: 2024-08-07

## 2024-08-07 RX ORDER — ATORVASTATIN CALCIUM 20 MG/1
20 TABLET, FILM COATED ORAL DAILY
Qty: 100 TABLET | Refills: 1 | Status: SHIPPED | OUTPATIENT
Start: 2024-08-07

## 2024-08-07 ASSESSMENT — ENCOUNTER SYMPTOMS
HEADACHES: 0
DIARRHEA: 0
COUGH: 0
CONSTIPATION: 0
DIFFICULTY URINATING: 0
CHEST TIGHTNESS: 0
DYSPHORIC MOOD: 0
BLOOD IN STOOL: 0
EYE PAIN: 0
APPETITE CHANGE: 0
ARTHRALGIAS: 0
FATIGUE: 0
NERVOUS/ANXIOUS: 0
ADENOPATHY: 0
DYSURIA: 0
SORE THROAT: 0
FEVER: 0
ABDOMINAL DISTENTION: 0
SHORTNESS OF BREATH: 0
DIZZINESS: 0
WEAKNESS: 0
EYE REDNESS: 0
CHILLS: 0
BRUISES/BLEEDS EASILY: 0
ABDOMINAL PAIN: 0

## 2024-08-07 NOTE — PROGRESS NOTES
Subjective   Patient ID: Zacarias Holbrook is a 69 y.o. male who presents for Follow-up.    HPI     Review of Systems    Objective   There were no vitals taken for this visit.    Physical Exam    Assessment/Plan

## 2024-08-07 NOTE — PROGRESS NOTES
Subjective   Patient ID: Zacarias Holbrook is a 69 y.o. male who presents for Follow-up.  Video virtual visit. Consent obtained.     Pt has stable CAD, Ascending ascending aneurysm, as and Dyslipidemia.   Lipid panel showed LDL 76.  Currently taking Atorvastatin and is tolerating well without muscle pains or weakness.         Review of Systems   Constitutional:  Negative for appetite change, chills, fatigue and fever.   HENT:  Negative for congestion, hearing loss and sore throat.    Eyes:  Negative for pain, redness and visual disturbance.   Respiratory:  Negative for cough, chest tightness and shortness of breath.    Cardiovascular:  Negative for chest pain and leg swelling.   Gastrointestinal:  Negative for abdominal distention, abdominal pain, blood in stool, constipation and diarrhea.   Genitourinary:  Negative for difficulty urinating and dysuria.   Musculoskeletal:  Positive for back pain (R low back). Negative for arthralgias.   Skin:  Negative for rash.   Neurological:  Negative for dizziness, weakness and headaches.   Hematological:  Negative for adenopathy. Does not bruise/bleed easily.   Psychiatric/Behavioral:  Negative for dysphoric mood. The patient is not nervous/anxious.        Objective   There were no vitals taken for this visit.   Physical Exam  Constitutional:       Appearance: Normal appearance. He is not ill-appearing.   Pulmonary:      Effort: Pulmonary effort is normal.   Psychiatric:         Mood and Affect: Mood normal.         Behavior: Behavior normal.         Thought Content: Thought content normal.           Assessment/Plan   Diagnoses and all orders for this visit:  Agatston CAC score 100-199/Aneurysm of ascending aorta - still need to set up appt with cardiology  Dyslipidemia - controlled,  continue low dose Atorvastatin for now  Chronic R-sided low back pain without sciatica/Inguinal strain, left- checking XR L-spine and referring to physical therapy.     Follow up in 6 months, 30min for  preventative

## 2024-08-08 ASSESSMENT — ENCOUNTER SYMPTOMS: BACK PAIN: 1

## 2024-08-10 ENCOUNTER — LAB (OUTPATIENT)
Dept: LAB | Facility: LAB | Age: 69
End: 2024-08-10
Payer: MEDICARE

## 2024-08-10 ENCOUNTER — HOSPITAL ENCOUNTER (OUTPATIENT)
Dept: RADIOLOGY | Facility: CLINIC | Age: 69
Discharge: HOME | End: 2024-08-10
Payer: MEDICARE

## 2024-08-10 DIAGNOSIS — Z13.89 SCREENING FOR BLOOD OR PROTEIN IN URINE: ICD-10-CM

## 2024-08-10 DIAGNOSIS — M54.50 CHRONIC LEFT-SIDED LOW BACK PAIN WITHOUT SCIATICA: ICD-10-CM

## 2024-08-10 DIAGNOSIS — G89.29 CHRONIC LEFT-SIDED LOW BACK PAIN WITHOUT SCIATICA: ICD-10-CM

## 2024-08-10 PROCEDURE — 72110 X-RAY EXAM L-2 SPINE 4/>VWS: CPT

## 2024-08-10 PROCEDURE — 81003 URINALYSIS AUTO W/O SCOPE: CPT

## 2024-08-11 LAB
APPEARANCE UR: CLEAR
BILIRUB UR STRIP.AUTO-MCNC: NEGATIVE MG/DL
COLOR UR: YELLOW
GLUCOSE UR STRIP.AUTO-MCNC: NORMAL MG/DL
HOLD SPECIMEN: NORMAL
KETONES UR STRIP.AUTO-MCNC: NEGATIVE MG/DL
LEUKOCYTE ESTERASE UR QL STRIP.AUTO: NEGATIVE
NITRITE UR QL STRIP.AUTO: NEGATIVE
PH UR STRIP.AUTO: 5 [PH]
PROT UR STRIP.AUTO-MCNC: NEGATIVE MG/DL
RBC # UR STRIP.AUTO: NEGATIVE /UL
SP GR UR STRIP.AUTO: 1.02
UROBILINOGEN UR STRIP.AUTO-MCNC: NORMAL MG/DL

## 2024-08-13 ENCOUNTER — APPOINTMENT (OUTPATIENT)
Dept: UROLOGY | Facility: CLINIC | Age: 69
End: 2024-08-13
Payer: MEDICARE

## 2024-08-13 VITALS — TEMPERATURE: 95.1 F | WEIGHT: 177 LBS | HEIGHT: 70 IN | BODY MASS INDEX: 25.34 KG/M2

## 2024-08-13 DIAGNOSIS — N52.9 ERECTILE DYSFUNCTION, UNSPECIFIED ERECTILE DYSFUNCTION TYPE: ICD-10-CM

## 2024-08-13 DIAGNOSIS — N52.8 OTHER MALE ERECTILE DYSFUNCTION: ICD-10-CM

## 2024-08-13 DIAGNOSIS — N48.6 PEYRONIE DISEASE: Primary | ICD-10-CM

## 2024-08-13 DIAGNOSIS — R39.9 LOWER URINARY TRACT SYMPTOMS (LUTS): ICD-10-CM

## 2024-08-13 PROCEDURE — 1126F AMNT PAIN NOTED NONE PRSNT: CPT | Performed by: UROLOGY

## 2024-08-13 PROCEDURE — 1036F TOBACCO NON-USER: CPT | Performed by: UROLOGY

## 2024-08-13 PROCEDURE — 1123F ACP DISCUSS/DSCN MKR DOCD: CPT | Performed by: UROLOGY

## 2024-08-13 PROCEDURE — 1159F MED LIST DOCD IN RCRD: CPT | Performed by: UROLOGY

## 2024-08-13 PROCEDURE — 3008F BODY MASS INDEX DOCD: CPT | Performed by: UROLOGY

## 2024-08-13 PROCEDURE — 99214 OFFICE O/P EST MOD 30 MIN: CPT | Performed by: UROLOGY

## 2024-08-13 RX ORDER — TADALAFIL 20 MG/1
20 TABLET ORAL DAILY PRN
Qty: 30 TABLET | Refills: 1 | Status: SHIPPED | OUTPATIENT
Start: 2024-08-13 | End: 2024-08-13 | Stop reason: SDUPTHER

## 2024-08-13 RX ORDER — TADALAFIL 20 MG/1
20 TABLET ORAL DAILY PRN
Qty: 30 TABLET | Refills: 11 | Status: SHIPPED | OUTPATIENT
Start: 2024-08-13

## 2024-08-13 ASSESSMENT — PAIN SCALES - GENERAL: PAINLEVEL: 0-NO PAIN

## 2024-08-13 NOTE — PROGRESS NOTES
Last visit 8/2023  -continue Cialis 20 mg prn     #Peyronies disease -  -discussed limitation of treatment options given he has waisting rather than penile curvature  -can consider repeat doppler US if symptoms persist    Today's visit:  #Peyronie's disease  Started with trauma during intercourse with no bruising at the time  PDU 11/2016: good rigidity with trimix, no venous leak or arterial insufficiency  Left sided plaque with indentations  No curvature  Constriction at base of penis, ring like defect     #Erectile dysfunction  -taking cialis 20mg prn --> overall happy with effect, wants to cut down on med  -no priapism, pain   -no headaches  -some back pain      -no ntg, no heart issues  -libido strong  -noticed decreased ejaculation in last yr    #luts  -weaker stream  -no straining  -no retention  -no pain.blood       Labs 1/23/23: T 602, A1C 5.3, rest wnl       Labs  Component      Latest Ref Rng 1/24/2024   GLUCOSE      74 - 99 mg/dL 82    SODIUM      136 - 145 mmol/L 140    POTASSIUM      3.5 - 5.3 mmol/L 4.4    CHLORIDE      98 - 107 mmol/L 104    Bicarbonate      21 - 32 mmol/L 29    Anion Gap      10 - 20 mmol/L 11    Blood Urea Nitrogen      6 - 23 mg/dL 15    Creatinine      0.50 - 1.30 mg/dL 1.01    EGFR      >60 mL/min/1.73m*2 82    Calcium      8.6 - 10.6 mg/dL 9.1    Albumin      3.4 - 5.0 g/dL 4.0    Alkaline Phosphatase      33 - 136 U/L 63    Total Protein      6.4 - 8.2 g/dL 6.0 (L)    AST      9 - 39 U/L 20    Bilirubin Total      0.0 - 1.2 mg/dL 0.6    ALT      10 - 52 U/L 22       Legend:  (L) Low    Lab Results   Component Value Date    PSA 2.91 02/01/2022    PSA 2.42 12/07/2020    PSA 2.28 12/09/2019     Component      Latest Ref Rng 1/24/2024 8/10/2024   Color, Urine      Light-Yellow, Yellow, Dark-Yellow  Cathy ! (N)  Yellow    Appearance, Urine      Clear  Hazy ! (N)  Clear    Specific Gravity, Urine      1.005 - 1.035  1.021  1.017    pH, Urine      5.0, 5.5, 6.0, 6.5, 7.0, 7.5, 8.0   "5.0  5.0    Protein, Urine      NEGATIVE, 10 (TRACE), 20 (TRACE) mg/dL NEGATIVE  NEGATIVE    Glucose, Urine      Normal mg/dL NEGATIVE  Normal    Blood, Urine      NEGATIVE  NEGATIVE  NEGATIVE    Ketones, Urine      NEGATIVE mg/dL NEGATIVE  NEGATIVE    Bilirubin, Urine      NEGATIVE  NEGATIVE  NEGATIVE    Urobilinogen, Urine      Normal mg/dL <2.0  Normal    Nitrite, Urine      NEGATIVE  NEGATIVE  NEGATIVE    Leukocyte Esterase, Urine      NEGATIVE  NEGATIVE  NEGATIVE    Prostate Specific Antigen,Screen      <=4.00 ng/mL 2.73     Extra Tube  Hold for add-ons.       Legend:  ! (N) Normal      Lab Results   Component Value Date    LH 4.4 01/23/2023     Lab Results   Component Value Date    FSH 6.0 01/23/2023     No components found for: \"ESTRADIAL\"  Lab Results   Component Value Date    PSA 2.91 02/01/2022     No components found for: \"CBC\"  Lab Results   Component Value Date    PROLACTIN 10.0 01/23/2023     Lab Results   Component Value Date    HGBA1C 5.3 01/23/2023     No components found for: \"HEMATOCRIT\"      Medications:    Current Outpatient Medications:     atorvastatin (Lipitor) 20 mg tablet, Take 1 tablet (20 mg) by mouth once daily., Disp: 100 tablet, Rfl: 1    tadalafil 20 mg tablet, Take 1 tablet (20 mg) by mouth once daily as needed for erectile dysfunction., Disp: 30 tablet, Rfl: 1    Allergy:  No Known Allergies     Exam  CONSTITUTIONAL:        No acute distress    HEAD:        Normocephalic and atraumatic    CHEST / RESPIRATORY      no excess work of breathing, no respiratory distress,    ABDOMEN / GASTROINTESTINAL:        Abdomen nondistended    Testicles descended bilaterally, nontender, no masses  Vasa palpable bilaterally  Penis circ'd, no lesions, no plaques        Assessment/Plan  #Erectile Dysfunction  -continue Cialis 20 mg prn (refilled) can cut down to      #Peyronies disease -  -stable, continue cialis prn    We discussed the different causes for urinary difficulties as patients age, " specifically BPH, bladder overactivity, and even stricture disease. We discussed different medications that could help him with his symptoms, including cialis daily. Discussed rb/b/a of cialis dialy, pt declined  -bladder scan today    Fu with CNP in 1 year

## 2024-09-24 DIAGNOSIS — R93.1 AGATSTON CAC SCORE 100-199: Primary | ICD-10-CM

## 2024-09-24 DIAGNOSIS — I71.21 ANEURYSM OF ASCENDING AORTA WITHOUT RUPTURE (CMS-HCC): ICD-10-CM

## 2024-09-30 ENCOUNTER — APPOINTMENT (OUTPATIENT)
Dept: PHYSICAL THERAPY | Facility: CLINIC | Age: 69
End: 2024-09-30
Payer: MEDICARE

## 2024-09-30 DIAGNOSIS — M54.50 CHRONIC LEFT-SIDED LOW BACK PAIN WITHOUT SCIATICA: ICD-10-CM

## 2024-09-30 DIAGNOSIS — S76.212D INGUINAL STRAIN, LEFT, SUBSEQUENT ENCOUNTER: ICD-10-CM

## 2024-09-30 DIAGNOSIS — M54.50 ACUTE LEFT-SIDED LOW BACK PAIN WITHOUT SCIATICA: Primary | ICD-10-CM

## 2024-09-30 DIAGNOSIS — G89.29 CHRONIC LEFT-SIDED LOW BACK PAIN WITHOUT SCIATICA: ICD-10-CM

## 2024-09-30 PROCEDURE — 97110 THERAPEUTIC EXERCISES: CPT | Mod: GP | Performed by: PHYSICAL THERAPIST

## 2024-09-30 PROCEDURE — 97161 PT EVAL LOW COMPLEX 20 MIN: CPT | Mod: GP | Performed by: PHYSICAL THERAPIST

## 2024-09-30 ASSESSMENT — ENCOUNTER SYMPTOMS
LOSS OF SENSATION IN FEET: 0
DEPRESSION: 0
OCCASIONAL FEELINGS OF UNSTEADINESS: 0

## 2024-09-30 NOTE — PROGRESS NOTES
Physical Therapy Evaluation    Patient Name: Zacarias Holbrook  MRN: [id number]  Today's Date: 9/30/2024  Visit:1  Referred by: Dr. Womack  Diagnosis:   1. Acute left-sided low back pain without sciatica        PRECAUTIONS:   none    SUBJECTIVE:  69 y.o. male with c/o R) LBP x 1 yr.  Insidious onset.  No prior Hx of LBP.    Usually Low level ache but can get short sharp pains, usually with transitional movements.  Worse: prolonged standing, prolonged driving, transition, running.  Better: sitting, change positions frequently  Pertinent negatives- (-) cs,nt,bb,np,weakness  Pain:  0-5/10  Home Living:  Lives in 2 story home with wife.  Prior level of function:  Use to run regularly but has stopped ~6 weeks ago secondary to pain  Personal factors that may impact care:  none  OBJECTIVE:  (-) SLR to 75* bilat.  5/5 LE myotome strength  5/5 upper abdominal strength  4/5 lower abdominal strength  Repeated movements-   Flex- produced R) LBP with reps, worse after   Ext- pain abolished with reps and after.   Bilat. SG- end range pull, no pain, (-) reps   SHANI- produced R) LBP with reps, worse after   EIL- LBP abolished with reps, better after.  Slouched sitting posture noted.  Pain reported improved with neutral spine sitting.  Outcome Measure:  Oswestry- 14%    ASSESSMENT:  Pt. With mechanical LBP that responded to postural correction and extension based ex's today.  Will started him performing these consistently throughout day and correcting sitting posture.  Progress to core training as able.    Problem list:   see above    Low complexity due to patient's clinical presentation being stable and uncomplicated by any significant comorbidities that may affect rehab tolerance and progression.     Clinical presentation:  Stable and/or uncomplicated characteristics,     TREATMENT:  - Therex:  EIS  3x10  EIL 5x10    PATIENT EDUCATION:  HEP, instruction on and practice of neutral spine sitting.    PLAN:   Daily HEP, EIS or EIL every 2  hours with postural correction in sitting.  Weekly PT x 8 weeks to progress towards PT goals.  Rehab potential: good  Plan of care agreement: Y    GOALS:  Active       PT Problem       Pt will have improved pain free ROM to assist with improving functional tolerances       Start:  09/30/24    Expected End:  12/27/24            Pt will have improved core muscle strength to assist with improving functional tolerances       Start:  09/30/24    Expected End:  12/27/24            Pt will have improved Oswestry score by at least 15%       Start:  09/30/24    Expected End:  12/27/24            Pt will be independent with HEP       Start:  09/30/24    Expected End:  11/08/24            Pt will have decreased reports of pain by at least 2 levels       Start:  09/30/24    Expected End:  11/08/24

## 2024-10-07 ENCOUNTER — TREATMENT (OUTPATIENT)
Dept: PHYSICAL THERAPY | Facility: CLINIC | Age: 69
End: 2024-10-07
Payer: MEDICARE

## 2024-10-07 DIAGNOSIS — S76.212D INGUINAL STRAIN, LEFT, SUBSEQUENT ENCOUNTER: Primary | ICD-10-CM

## 2024-10-07 DIAGNOSIS — G89.29 CHRONIC LEFT-SIDED LOW BACK PAIN WITHOUT SCIATICA: ICD-10-CM

## 2024-10-07 DIAGNOSIS — M54.50 CHRONIC LEFT-SIDED LOW BACK PAIN WITHOUT SCIATICA: ICD-10-CM

## 2024-10-07 PROCEDURE — 97110 THERAPEUTIC EXERCISES: CPT | Mod: GP | Performed by: PHYSICAL THERAPIST

## 2024-10-07 NOTE — PROGRESS NOTES
Physical Therapy Treatment    Patient Name: Zacarias Holbrook  MRN: [id number]  Today's Date: 10/7/2024  Visit:2  Referred by: Dr. Womack  Diagnosis:   1. Inguinal strain, left, subsequent encounter        2. Chronic left-sided low back pain without sciatica        PRECAUTIONS:   none    SUBJECTIVE:  69 y.o. male who returns for R) LBP.  Pain is less intense, better in AM, and tolerating sitting better with support.  HEP: ~6x/day.  Pain:  0- 3/10  Prior level of function:  Use to run regularly but has stopped ~6 weeks ago secondary to pain  Personal factors that may impact care:  none  OBJECTIVE:  Repeated movements-   Flex- produced R) LBP with reps, worse after   Ext- pain abolished with reps and after.   Bilat. SG- end range pull, no pain, (-) reps   SHANI- produced R) LBP with reps, worse after   EIL- LBP abolished with reps, better after.  Sitting postural habit improved today.  Outcome Measure:  Oswestry- 14%    ASSESSMENT:  Pt. With mechanical LBP whose symptoms centralized and decreased to ~1/10 with today's session.    TREATMENT:  - Therex:  EIS  2x10  R) SG  4x10  EIS 3x10  EIL x10  Flex/ROT in R) SL  x 2 min.  EIS in R) RK  3x10    PLAN:   Daily HEP: R) SG against wall with EIS or R) flex/ROT in R) SL and EIL in R) RK every 2 hours with postural correction in sitting.    GOALS:  Active       PT Problem       Pt will have improved pain free ROM to assist with improving functional tolerances       Start:  09/30/24    Expected End:  12/27/24            Pt will have improved core muscle strength to assist with improving functional tolerances       Start:  09/30/24    Expected End:  12/27/24            Pt will have improved Oswestry score by at least 15%       Start:  09/30/24    Expected End:  12/27/24            Pt will be independent with HEP       Start:  09/30/24    Expected End:  11/08/24            Pt will have decreased reports of pain by at least 2 levels       Start:  09/30/24    Expected End:  11/08/24

## 2024-10-21 ENCOUNTER — APPOINTMENT (OUTPATIENT)
Dept: CARDIOLOGY | Facility: CLINIC | Age: 69
End: 2024-10-21
Payer: MEDICARE

## 2024-11-11 ENCOUNTER — OFFICE VISIT (OUTPATIENT)
Dept: CARDIOLOGY | Facility: CLINIC | Age: 69
End: 2024-11-11
Payer: MEDICARE

## 2024-11-11 VITALS
HEART RATE: 76 BPM | HEIGHT: 70 IN | OXYGEN SATURATION: 96 % | SYSTOLIC BLOOD PRESSURE: 118 MMHG | BODY MASS INDEX: 24.98 KG/M2 | WEIGHT: 174.5 LBS | DIASTOLIC BLOOD PRESSURE: 72 MMHG

## 2024-11-11 DIAGNOSIS — R93.1 AGATSTON CAC SCORE 100-199: Primary | ICD-10-CM

## 2024-11-11 DIAGNOSIS — I71.21 ANEURYSM OF ASCENDING AORTA WITHOUT RUPTURE (CMS-HCC): ICD-10-CM

## 2024-11-11 PROCEDURE — 99204 OFFICE O/P NEW MOD 45 MIN: CPT | Performed by: INTERNAL MEDICINE

## 2024-11-11 PROCEDURE — 3008F BODY MASS INDEX DOCD: CPT | Performed by: INTERNAL MEDICINE

## 2024-11-11 PROCEDURE — 93005 ELECTROCARDIOGRAM TRACING: CPT | Performed by: INTERNAL MEDICINE

## 2024-11-11 PROCEDURE — 1126F AMNT PAIN NOTED NONE PRSNT: CPT | Performed by: INTERNAL MEDICINE

## 2024-11-11 PROCEDURE — 1123F ACP DISCUSS/DSCN MKR DOCD: CPT | Performed by: INTERNAL MEDICINE

## 2024-11-11 PROCEDURE — 1159F MED LIST DOCD IN RCRD: CPT | Performed by: INTERNAL MEDICINE

## 2024-11-11 PROCEDURE — 99214 OFFICE O/P EST MOD 30 MIN: CPT | Performed by: INTERNAL MEDICINE

## 2024-11-11 PROCEDURE — 1036F TOBACCO NON-USER: CPT | Performed by: INTERNAL MEDICINE

## 2024-11-11 ASSESSMENT — PATIENT HEALTH QUESTIONNAIRE - PHQ9
1. LITTLE INTEREST OR PLEASURE IN DOING THINGS: NOT AT ALL
2. FEELING DOWN, DEPRESSED OR HOPELESS: NOT AT ALL
SUM OF ALL RESPONSES TO PHQ9 QUESTIONS 1 AND 2: 0

## 2024-11-11 ASSESSMENT — COLUMBIA-SUICIDE SEVERITY RATING SCALE - C-SSRS
6. HAVE YOU EVER DONE ANYTHING, STARTED TO DO ANYTHING, OR PREPARED TO DO ANYTHING TO END YOUR LIFE?: NO
2. HAVE YOU ACTUALLY HAD ANY THOUGHTS OF KILLING YOURSELF?: NO
1. IN THE PAST MONTH, HAVE YOU WISHED YOU WERE DEAD OR WISHED YOU COULD GO TO SLEEP AND NOT WAKE UP?: NO

## 2024-11-11 ASSESSMENT — ENCOUNTER SYMPTOMS
OCCASIONAL FEELINGS OF UNSTEADINESS: 0
LOSS OF SENSATION IN FEET: 0
DEPRESSION: 0

## 2024-11-11 ASSESSMENT — PAIN SCALES - GENERAL: PAINLEVEL_OUTOF10: 0-NO PAIN

## 2024-11-11 NOTE — PROGRESS NOTES
Referred by Dr. Womack for No chief complaint on file.     History Of Present Illness:    Zacarias Holbrook is a 69 y.o. male with complex PMH (detailed below) presenting to outpatient cardiology clinic here to establish care. The patient presents to outpatient cardiology clinic in the usual state of health and is tolerating their current medication regimen without difficulty.  They report no complaints and deny any red flag cardiovascular symptoms including chest discomfort, left arm or jaw symptoms, palpitations, presyncope, syncope, or changes in abdominal or lower extremity edema.  The patient is able to complete all activities of daily living without limitation.  The cardiovascular status remained stable, with no changes in clinical condition in the last several weeks / months.    The patient was referred to us after coronary calcium score was obtained in February 2024 showing a value of approximately 140 as well as mildly dilated ascending aorta.  The patient maintains AN active lifestyle, lifelong swimmer works out 6 times weekly.  Very high level of exercise capacity as well as quality of life.  Physically fit, doing very well clinically.    Past Medical History:  Coronary calcium 140  Dilated ascending aorta, 4.1 cm    Review of Systems   Constitutional:  No Weight Change, No Fever, No Chills, No Night Sweats, No Fatigue, No Malaise   ENT/Mouth:  No Hearing Changes, No Ear Pain, No Nasal Congestion, No  Sinus Pain, No Hoarseness, No sore throat, No Rhinorrhea, No Swallowing  Difficulty   Eyes:  No Eye Pain, No Swelling, No Redness, No Foreign Body, No Discharge, No Vision Changes   Cardiovascular:  See HPI   Respiratory:  No Cough, No Sputum, No Wheezing, No Smoke Exposure, No Dyspnea   Gastrointestinal:  No Nausea, No Vomiting, No Diarrhea, No  Constipation, No Pain, No Heartburn, No Anorexia, No Dysphagia, No  Hematochezia, No Melena, No Flatulence, No Jaundice   Genitourinary:  No Dysmenorrhea, No DUB, No  Dyspareunia, No Dysuria, No  Urinary Frequency, No Hematuria, No Urinary Incontinence, No Urgency,  No Flank Pain, No Urinary Flow Changes   Musculoskeletal:  No Arthralgias, No Myalgias, No Joint Swelling, No  Joint Stiffness, No Back Pain, No Neck Pain, No Injury History   Skin:  No Skin Lesions, No Pruritis, No Hair Changes, No Breast/Skin Changes, No Nipple Discharge   Neuro:  No Weakness, No Numbness, No Paresthesias, No Loss of  Consciousness, No Syncope, No Dizziness, No Headache, No Coordination  Changes, No Recent Falls   Psych:  No Anxiety/Panic, No Depression, No Insomnia, No Delusions, No Rumination, No SI/HI/AH/VH, No Social Issues,  No Memory Changes, No Violence/Abuse Hx., No Eating Concerns   Heme/Lymph:  No Bruising, No Bleeding, No Transfusions History, No Lymphadenopathy   Endocrine:  No Polyuria, No Polydipsia, No Temperature Intolerance        Past Medical History:  He has a past medical history of Acute left-sided low back pain without sciatica (07/14/2023), Colicky LLQ abdominal pain (07/14/2023), Constipation (07/14/2023), Contact dermatitis (07/14/2023), IFG (impaired fasting glucose) (07/14/2023), Ileitis, terminal (Multi) (07/14/2023), Pain in unspecified shoulder (04/17/2019), Strain of unspecified muscle, fascia and tendon at shoulder and upper arm level, left arm, initial encounter (06/04/2019), and Unspecified abdominal pain (10/15/2019).    Past Surgical History:  He has a past surgical history that includes Other surgical history (10/26/2018); Other surgical history (10/26/2018); Tonsillectomy (10/26/2018); Circumcision, primary (1957); Eye surgery (1964); and [place] tooth extraction (1970).      Social History:  He reports that he has never smoked. He has never used smokeless tobacco. He reports current alcohol use of about 3.0 standard drinks of alcohol per week. He reports that he does not use drugs.    Family History:  Family History   Problem Relation Name Age of Onset     "Atrial fibrillation Father Edward     Hearing loss Father Edward         Allergies:  Patient has no known allergies.    Outpatient Medications:  Current Outpatient Medications   Medication Instructions    atorvastatin (LIPITOR) 20 mg, oral, Daily    tadalafil (CIALIS) 20 mg, oral, Daily PRN        Last Recorded Vitals:  Vitals:    11/11/24 0902 11/11/24 0903   BP: 119/78 118/72   BP Location: Right arm Left arm   Patient Position: Sitting Sitting   BP Cuff Size: Large adult Large adult   Pulse: 76    SpO2: 96%    Weight: 79.2 kg (174 lb 8 oz)    Height: 1.778 m (5' 10\")        Physical Exam:  Physical exam  GEN: calm, cooperative, asking appropriate questions  NEURO: Alert and oriented x3, CN2-12 intact, SILT, Moving all extremities without difficulty  HEENT: atraumatic, no goiter, no JVD, normal uvula   CARDS: PMI is non-displaced, RRR, no MRG  PULM: CTAB, no wheezes / rales / rhonchi   ABD: soft, non-distended, non-tender, non-tympanitic, BS in all 4 quadrants. No hepatosplenomegaly  : unremarkable  SKIN: healthy appearing, normal skin turgor   EXT: atraumatic  VASC: b/l radial pulses are brisk and equal            Last Labs:  CBC -  Lab Results   Component Value Date    WBC 4.8 08/05/2024    HGB 14.2 08/05/2024    HCT 42.1 08/05/2024    MCV 91 08/05/2024     08/05/2024       CMP -  Lab Results   Component Value Date    CALCIUM 9.1 01/24/2024    PROT 6.0 (L) 01/24/2024    ALBUMIN 4.0 01/24/2024    AST 20 01/24/2024    ALT 17 08/05/2024    ALKPHOS 63 01/24/2024    BILITOT 0.6 01/24/2024       LIPID PANEL -   Lab Results   Component Value Date    CHOL 163 08/05/2024    HDL 69.6 08/05/2024    CHHDL 2.3 08/05/2024    VLDL 17 08/05/2024    TRIG 87 08/05/2024    NHDL 93 08/05/2024       RENAL FUNCTION PANEL -   Lab Results   Component Value Date    K 4.4 01/24/2024       Lab Results   Component Value Date    HGBA1C 5.3 01/23/2023           Last Cardiology Tests:    ECG:  No results found for this or any " "previous visit (from the past 4464 hours).      Echo:  Echo Results:  No results found for this or any previous visit from the past 365 days.       Ejection Fractions:  No results found for: \"EF\"    Cath:  No results found for this or any previous visit from the past 365 days.        Stress Test:  Stress Results:  No results found for this or any previous visit from the past 365 days.       Cardiac Imaging:  XR lumbar spine complete 4+ views  Narrative: Interpreted By:  Becca Van,   STUDY:  XR LUMBAR SPINE COMPLETE 4+ VIEWS; ;  8/10/2024 9:17 am      INDICATION:  Signs/Symptoms:chronic low back pain.      COMPARISON:  None.      ACCESSION NUMBER(S):  [id number]      ORDERING CLINICIAN:  ANNMARIE ESPARZA      FINDINGS:  Standing AP, lateral, oblique and lumbosacral spot views were  obtained. 5 non-rib-bearing lumbar type vertebra are present.  Vertebral body heights are intact. L5-S1 disc is severely narrowed.  Small endplate spurs are present diffusely. There is moderate lower  lumbar facet arthropathy. No spondylolysis or spondylolisthesis is  identified.      Impression: Degenerative change with severe disc height loss at L5-S1          MACRO:  None      Signed by: Becca Van 8/13/2024 10:56 AM  Dictation workstation:   [id number]      Assessment/Plan   This is a 69-year-old male here to establish care for assessment and management of elevated coronary calcium score 142, as well as mildly dilated ascending aorta.  He reports to clinic in his usual state of health is tolerating his outpatient med regimen without difficulty and has no acute complaint.  ECG obtained in the office today is normal sinus rhythm with atrial and ventricular rates in the 70s, no convincing ST segment changes that would suggest ischemia at rest.  Overall the patient is doing very well, he will follow-up in the office in 6 months.  Please see detailed problem based assessment / plan below    # DLD + Coronary CA score 142 " (feb 2024)  - continue atorvastatin 20 mg once daily    # mildly dilated ascending aorta, 4.1cm feb 2024  - will repeat imaging in feb 2025    # Cardiovascular Health Maintenance  - Physical Activity: Encourage 10-15K steps per day  - Healthy Diet: Avoid high fat and high sodium foods (processed meats / fast foods)  --- consider a mediterranean or DASH diet, emphasizing vegetables, fruits, whole grains, lean proteins  - Avoidance of smoking and smoke exposure        Time Spent: I spent 40 minutes reviewing medical testing, obtaining medical history and counselling and educating on diagnosis and documenting clinical encounter.   C. Barton Gillombardo, MD  Interventional Cardiology  Pager: [phone]

## 2024-11-14 LAB
ATRIAL RATE: 72 BPM
P AXIS: 65 DEGREES
P OFFSET: 174 MS
P ONSET: 121 MS
PR INTERVAL: 200 MS
Q ONSET: 221 MS
QRS COUNT: 12 BEATS
QRS DURATION: 80 MS
QT INTERVAL: 390 MS
QTC CALCULATION(BAZETT): 427 MS
QTC FREDERICIA: 414 MS
R AXIS: -25 DEGREES
T AXIS: 54 DEGREES
T OFFSET: 416 MS
VENTRICULAR RATE: 72 BPM

## 2024-11-25 ENCOUNTER — APPOINTMENT (OUTPATIENT)
Dept: AUDIOLOGY | Facility: CLINIC | Age: 69
End: 2024-11-25
Payer: MEDICARE

## 2025-02-07 LAB
ALBUMIN SERPL-MCNC: 4.6 G/DL (ref 3.6–5.1)
ALP SERPL-CCNC: 58 U/L (ref 35–144)
ALT SERPL-CCNC: 16 U/L (ref 9–46)
ANION GAP SERPL CALCULATED.4IONS-SCNC: 12 MMOL/L (CALC) (ref 7–17)
AST SERPL-CCNC: 17 U/L (ref 10–35)
BILIRUB SERPL-MCNC: 0.8 MG/DL (ref 0.2–1.2)
BUN SERPL-MCNC: 12 MG/DL (ref 7–25)
CALCIUM SERPL-MCNC: 9.4 MG/DL (ref 8.6–10.3)
CHLORIDE SERPL-SCNC: 102 MMOL/L (ref 98–110)
CHOLEST SERPL-MCNC: 209 MG/DL
CHOLEST/HDLC SERPL: 2.8 (CALC)
CO2 SERPL-SCNC: 25 MMOL/L (ref 20–32)
CREAT SERPL-MCNC: 1.11 MG/DL (ref 0.7–1.35)
EGFRCR SERPLBLD CKD-EPI 2021: 72 ML/MIN/1.73M2
ERYTHROCYTE [DISTWIDTH] IN BLOOD BY AUTOMATED COUNT: 12.6 % (ref 11–15)
GLUCOSE SERPL-MCNC: 91 MG/DL (ref 65–99)
HCT VFR BLD AUTO: 44.5 % (ref 38.5–50)
HDLC SERPL-MCNC: 76 MG/DL
HGB BLD-MCNC: 14.7 G/DL (ref 13.2–17.1)
LDLC SERPL CALC-MCNC: 116 MG/DL (CALC)
MCH RBC QN AUTO: 31.3 PG (ref 27–33)
MCHC RBC AUTO-ENTMCNC: 33 G/DL (ref 32–36)
MCV RBC AUTO: 94.9 FL (ref 80–100)
NONHDLC SERPL-MCNC: 133 MG/DL (CALC)
PLATELET # BLD AUTO: 267 THOUSAND/UL (ref 140–400)
PMV BLD REES-ECKER: 10.6 FL (ref 7.5–12.5)
POTASSIUM SERPL-SCNC: 4.6 MMOL/L (ref 3.5–5.3)
PROT SERPL-MCNC: 6.5 G/DL (ref 6.1–8.1)
PSA SERPL-MCNC: 2.88 NG/ML
RBC # BLD AUTO: 4.69 MILLION/UL (ref 4.2–5.8)
SODIUM SERPL-SCNC: 139 MMOL/L (ref 135–146)
TRIGL SERPL-MCNC: 76 MG/DL
TSH SERPL-ACNC: 2.1 MIU/L (ref 0.4–4.5)
WBC # BLD AUTO: 3.4 THOUSAND/UL (ref 3.8–10.8)

## 2025-02-10 ASSESSMENT — PATIENT GLOBAL ASSESSMENT (PGA): WHAT IS THE PGA: PATIENT GLOBAL ASSESSMENT:  2 - MILD

## 2025-02-10 ASSESSMENT — DERMATOLOGY QUALITY OF LIFE (QOL) ASSESSMENT
RATE HOW EMOTIONALLY BOTHERED YOU ARE BY YOUR SKIN PROBLEM (FOR EXAMPLE, WORRY, EMBARRASSMENT, FRUSTRATION): 0 - NEVER BOTHERED
WHAT SINGLE SKIN CONDITION LISTED BELOW IS THE PATIENT ANSWERING THE QUALITY-OF-LIFE ASSESSMENT QUESTIONS ABOUT: ACTINIC KERATOSIS
RATE HOW BOTHERED YOU ARE BY SYMPTOMS OF YOUR SKIN PROBLEM (EG, ITCHING, STINGING BURNING, HURTING OR SKIN IRRITATION): 0 - NEVER BOTHERED
RATE HOW BOTHERED YOU ARE BY SYMPTOMS OF YOUR SKIN PROBLEM (EG, ITCHING, STINGING BURNING, HURTING OR SKIN IRRITATION): 0 - NEVER BOTHERED
DATE THE QUALITY-OF-LIFE ASSESSMENT WAS COMPLETED: 67246
RATE HOW BOTHERED YOU ARE BY EFFECTS OF YOUR SKIN PROBLEMS ON YOUR ACTIVITIES (EG, GOING OUT, ACCOMPLISHING WHAT YOU WANT, WORK ACTIVITIES OR YOUR RELATIONSHIPS WITH OTHERS): 0 - NEVER BOTHERED
RATE HOW EMOTIONALLY BOTHERED YOU ARE BY YOUR SKIN PROBLEM (FOR EXAMPLE, WORRY, EMBARRASSMENT, FRUSTRATION): 0 - NEVER BOTHERED
RATE HOW BOTHERED YOU ARE BY EFFECTS OF YOUR SKIN PROBLEMS ON YOUR ACTIVITIES (EG, GOING OUT, ACCOMPLISHING WHAT YOU WANT, WORK ACTIVITIES OR YOUR RELATIONSHIPS WITH OTHERS): 0 - NEVER BOTHERED
WHAT SINGLE SKIN CONDITION LISTED BELOW IS THE PATIENT ANSWERING THE QUALITY-OF-LIFE ASSESSMENT QUESTIONS ABOUT: ACTINIC KERATOSIS

## 2025-02-11 ENCOUNTER — HOSPITAL ENCOUNTER (OUTPATIENT)
Dept: RADIOLOGY | Facility: CLINIC | Age: 70
Discharge: HOME | End: 2025-02-11
Payer: MEDICARE

## 2025-02-11 DIAGNOSIS — I71.21 ANEURYSM OF ASCENDING AORTA WITHOUT RUPTURE (CMS-HCC): ICD-10-CM

## 2025-02-11 PROCEDURE — 71275 CT ANGIOGRAPHY CHEST: CPT

## 2025-02-11 PROCEDURE — 2550000001 HC RX 255 CONTRASTS: Performed by: INTERNAL MEDICINE

## 2025-02-11 RX ADMIN — IOHEXOL 68 ML: 350 INJECTION, SOLUTION INTRAVENOUS at 09:09

## 2025-02-13 ENCOUNTER — APPOINTMENT (OUTPATIENT)
Dept: PRIMARY CARE | Facility: CLINIC | Age: 70
End: 2025-02-13
Payer: MEDICARE

## 2025-02-13 VITALS
DIASTOLIC BLOOD PRESSURE: 80 MMHG | WEIGHT: 177 LBS | HEART RATE: 80 BPM | HEIGHT: 70 IN | BODY MASS INDEX: 25.34 KG/M2 | OXYGEN SATURATION: 97 % | SYSTOLIC BLOOD PRESSURE: 128 MMHG | RESPIRATION RATE: 12 BRPM

## 2025-02-13 DIAGNOSIS — E78.5 DYSLIPIDEMIA: ICD-10-CM

## 2025-02-13 DIAGNOSIS — I71.21 ANEURYSM OF ASCENDING AORTA WITHOUT RUPTURE (CMS-HCC): ICD-10-CM

## 2025-02-13 DIAGNOSIS — Z00.00 HEALTH CARE MAINTENANCE: Primary | ICD-10-CM

## 2025-02-13 DIAGNOSIS — R93.1 AGATSTON CAC SCORE 100-199: ICD-10-CM

## 2025-02-13 PROCEDURE — 1170F FXNL STATUS ASSESSED: CPT | Performed by: FAMILY MEDICINE

## 2025-02-13 PROCEDURE — 1159F MED LIST DOCD IN RCRD: CPT | Performed by: FAMILY MEDICINE

## 2025-02-13 PROCEDURE — 1158F ADVNC CARE PLAN TLK DOCD: CPT | Performed by: FAMILY MEDICINE

## 2025-02-13 PROCEDURE — 1160F RVW MEDS BY RX/DR IN RCRD: CPT | Performed by: FAMILY MEDICINE

## 2025-02-13 PROCEDURE — 1123F ACP DISCUSS/DSCN MKR DOCD: CPT | Performed by: FAMILY MEDICINE

## 2025-02-13 PROCEDURE — 1036F TOBACCO NON-USER: CPT | Performed by: FAMILY MEDICINE

## 2025-02-13 PROCEDURE — 3008F BODY MASS INDEX DOCD: CPT | Performed by: FAMILY MEDICINE

## 2025-02-13 PROCEDURE — G0439 PPPS, SUBSEQ VISIT: HCPCS | Performed by: FAMILY MEDICINE

## 2025-02-13 RX ORDER — ATORVASTATIN CALCIUM 20 MG/1
20 TABLET, FILM COATED ORAL DAILY
Qty: 100 TABLET | Refills: 1 | Status: SHIPPED | OUTPATIENT
Start: 2025-02-13

## 2025-02-13 ASSESSMENT — ENCOUNTER SYMPTOMS
DEPRESSION: 0
BRUISES/BLEEDS EASILY: 0
CHILLS: 0
SORE THROAT: 0
FEVER: 0
EYE PAIN: 0
DIFFICULTY URINATING: 0
BACK PAIN: 0
OCCASIONAL FEELINGS OF UNSTEADINESS: 0
DIARRHEA: 0
DYSURIA: 0
ADENOPATHY: 0
CHEST TIGHTNESS: 0
EYE REDNESS: 0
SHORTNESS OF BREATH: 0
DYSPHORIC MOOD: 0
BLOOD IN STOOL: 0
FATIGUE: 0
WEAKNESS: 0
CONSTIPATION: 0
LOSS OF SENSATION IN FEET: 0
HEADACHES: 0
DIZZINESS: 0
NERVOUS/ANXIOUS: 0
ABDOMINAL PAIN: 0
APPETITE CHANGE: 0
ARTHRALGIAS: 0
ABDOMINAL DISTENTION: 0
COUGH: 0

## 2025-02-13 ASSESSMENT — ACTIVITIES OF DAILY LIVING (ADL)
BATHING: INDEPENDENT
GROCERY_SHOPPING: INDEPENDENT
TAKING_MEDICATION: INDEPENDENT
DRESSING: INDEPENDENT
MANAGING_FINANCES: INDEPENDENT
DOING_HOUSEWORK: INDEPENDENT

## 2025-02-13 ASSESSMENT — PATIENT HEALTH QUESTIONNAIRE - PHQ9
SUM OF ALL RESPONSES TO PHQ9 QUESTIONS 1 AND 2: 0
2. FEELING DOWN, DEPRESSED OR HOPELESS: NOT AT ALL
1. LITTLE INTEREST OR PLEASURE IN DOING THINGS: NOT AT ALL

## 2025-02-13 NOTE — PROGRESS NOTES
"Subjective   Patient ID: Zacarias Holbrook is a 70 y.o. male who presents for Medicare Annual Wellness Visit Subsequent.  PMHX, PSHx, Fam hx, and Social hx reviewed.   New concerns none  Vaccines Flu, Shingles and Pneumonia vaccines recommended  Dentist seen at least yearly yes  Vision concerns none  Hearing concerns none  Diet is usually overall healthy.   Smoker - no  Lung CT/screening - NA  AAA screening - NA  Alcohol use - 2-3 drinks per week  Exercising 6 days per week.   Sexually active - yes,  no issues  Colonoscopy 2022, possibly due this year  ACP - advance directives, code status, and DPOA documentation discussed    Pt has Dyslipidemia. CAC score and CT shows plaque in LAD  Lipid panel showed .  Currently taking Atorvastatin.             Review of Systems   Constitutional:  Negative for appetite change, chills, fatigue and fever.   HENT:  Negative for congestion, hearing loss and sore throat.    Eyes:  Negative for pain, redness and visual disturbance.   Respiratory:  Negative for cough, chest tightness and shortness of breath.    Cardiovascular:  Negative for chest pain and leg swelling.   Gastrointestinal:  Negative for abdominal distention, abdominal pain, blood in stool, constipation and diarrhea.   Genitourinary:  Negative for difficulty urinating and dysuria.   Musculoskeletal:  Negative for arthralgias and back pain.   Skin:  Negative for rash.   Neurological:  Negative for dizziness, weakness and headaches.   Hematological:  Negative for adenopathy. Does not bruise/bleed easily.   Psychiatric/Behavioral:  Negative for dysphoric mood. The patient is not nervous/anxious.        Objective   /80   Pulse 80   Resp 12   Ht 1.778 m (5' 10\")   Wt 80.3 kg (177 lb)   SpO2 97%   BMI 25.40 kg/m²    Physical Exam  Constitutional:       General: He is not in acute distress.     Appearance: Normal appearance. He is not ill-appearing.   HENT:      Head: Normocephalic and atraumatic.      Right Ear: " Tympanic membrane, ear canal and external ear normal.      Left Ear: Tympanic membrane, ear canal and external ear normal.      Nose: Nose normal.      Mouth/Throat:      Mouth: Mucous membranes are moist.      Pharynx: No oropharyngeal exudate or posterior oropharyngeal erythema.   Eyes:      Extraocular Movements: Extraocular movements intact.      Conjunctiva/sclera: Conjunctivae normal.      Pupils: Pupils are equal, round, and reactive to light.   Neck:      Thyroid: No thyroid mass or thyromegaly.      Vascular: No carotid bruit.   Cardiovascular:      Rate and Rhythm: Normal rate and regular rhythm.      Heart sounds: Normal heart sounds. No murmur heard.  Pulmonary:      Breath sounds: Normal breath sounds. No wheezing, rhonchi or rales.   Abdominal:      General: Bowel sounds are normal. There is no distension.      Palpations: Abdomen is soft. There is no mass.      Tenderness: There is no abdominal tenderness.   Genitourinary:     Rectum: Guaiac result negative.      Comments: ~ 50 g prostate without nodules or asymmetry   Musculoskeletal:         General: No swelling or deformity.      Cervical back: Neck supple. No tenderness.   Lymphadenopathy:      Cervical: No cervical adenopathy.   Skin:     General: Skin is warm and dry.      Findings: No lesion or rash.   Neurological:      Mental Status: He is alert and oriented to person, place, and time.      Sensory: No sensory deficit.      Motor: No weakness.      Coordination: Coordination normal.      Deep Tendon Reflexes: Reflexes normal.   Psychiatric:         Mood and Affect: Mood normal.         Behavior: Behavior normal.         Judgment: Judgment normal.       Medicare Wellness Billing Compliance Satisfied    *This is a visual tool to show completion of required items on the day of the visit. Green checks will only appear on the date of visit.    Review all medications by prescribing practitioner or clinical pharmacist (such as prescriptions, OTCs,  herbal therapies and supplements) documented in the medical record    Past Medical, Surgical, and Family History reviewed and updated in chart    Tobacco Use Reviewed    Alcohol Use Reviewed    Illicit Drug Use Reviewed    PHQ2/9    Falls in Last Year Reviewed    Home Safety Risk Factors Reviewed    Cognitive Impairment Reviewed    Patient Self Assessment and Health Status    Current Diet Reviewed    Exercise Frequency    ADL - Hearing Impairment    ADL - Bathing    ADL - Dressing    ADL - Walks in Home    IADL - Managing Finances    IADL - Grocery Shopping    IADL - Taking Medications    IADL - Doing Housework        Assessment/Plan   Diagnoses and all orders for this visit:  Healthcare maintenance - Flu, Shingles and Pneumonia vaccines recommended. Labs reviewed and discussed. Colonoscopy possibly due, he will check with Dr Mora.  Aneurysm of ascending aorta /Agatston CAC score 100-199 - CTA just checked, keep plan for follow up with Dr Gillombardo  Dyslipidemia - LDL over goal, try to take Atorvastatin, consistently.     Follow up in 6months, 15min

## 2025-02-13 NOTE — PATIENT INSTRUCTIONS

## 2025-02-13 NOTE — PROGRESS NOTES
"Subjective   Reason for Visit: Zacarias Holbrook is an 70 y.o. male here for a Medicare Wellness visit.     Past Medical, Surgical, and Family History reviewed and updated in chart.    Reviewed all medications by prescribing practitioner or clinical pharmacist (such as prescriptions, OTCs, herbal therapies and supplements) and documented in the medical record.    HPI    Patient Care Team:  Henrry Womack MD as PCP - General  Henrry Womack MD as PCP - [employer] PCP     Review of Systems    Objective   Vitals:  /80   Pulse 80   Resp 12   Ht 1.778 m (5' 10\")   Wt 80.3 kg (177 lb)   SpO2 97%   BMI 25.40 kg/m²       Physical Exam    Assessment & Plan              "

## 2025-02-14 ENCOUNTER — APPOINTMENT (OUTPATIENT)
Dept: DERMATOLOGY | Facility: CLINIC | Age: 70
End: 2025-02-14
Payer: MEDICARE

## 2025-02-14 DIAGNOSIS — L57.0 ACTINIC KERATOSIS: ICD-10-CM

## 2025-02-14 DIAGNOSIS — D22.9 MULTIPLE BENIGN MELANOCYTIC NEVI: ICD-10-CM

## 2025-02-14 DIAGNOSIS — L82.1 SEBORRHEIC KERATOSES: ICD-10-CM

## 2025-02-14 DIAGNOSIS — D18.01 CHERRY ANGIOMA: ICD-10-CM

## 2025-02-14 DIAGNOSIS — Z12.83 SKIN CANCER SCREENING: ICD-10-CM

## 2025-02-14 DIAGNOSIS — D48.5 NEOPLASM OF UNCERTAIN BEHAVIOR OF SKIN: Primary | ICD-10-CM

## 2025-02-14 PROCEDURE — 11102 TANGNTL BX SKIN SINGLE LES: CPT | Performed by: DERMATOLOGY

## 2025-02-14 PROCEDURE — 1123F ACP DISCUSS/DSCN MKR DOCD: CPT | Performed by: DERMATOLOGY

## 2025-02-14 PROCEDURE — 1159F MED LIST DOCD IN RCRD: CPT | Performed by: DERMATOLOGY

## 2025-02-14 PROCEDURE — 99213 OFFICE O/P EST LOW 20 MIN: CPT | Performed by: DERMATOLOGY

## 2025-02-14 PROCEDURE — 1036F TOBACCO NON-USER: CPT | Performed by: DERMATOLOGY

## 2025-02-14 RX ORDER — FLUOROURACIL 50 MG/G
CREAM TOPICAL
Qty: 60 G | Refills: 2 | Status: SHIPPED | OUTPATIENT
Start: 2025-02-14

## 2025-02-14 ASSESSMENT — DERMATOLOGY QUALITY OF LIFE (QOL) ASSESSMENT
RATE HOW EMOTIONALLY BOTHERED YOU ARE BY YOUR SKIN PROBLEM (FOR EXAMPLE, WORRY, EMBARRASSMENT, FRUSTRATION): 0 - NEVER BOTHERED
DATE THE QUALITY-OF-LIFE ASSESSMENT WAS COMPLETED: 67250
ARE THERE EXCLUSIONS OR EXCEPTIONS FOR THE QUALITY OF LIFE ASSESSMENT: NO
RATE HOW BOTHERED YOU ARE BY SYMPTOMS OF YOUR SKIN PROBLEM (EG, ITCHING, STINGING BURNING, HURTING OR SKIN IRRITATION): 0 - NEVER BOTHERED
RATE HOW BOTHERED YOU ARE BY EFFECTS OF YOUR SKIN PROBLEMS ON YOUR ACTIVITIES (EG, GOING OUT, ACCOMPLISHING WHAT YOU WANT, WORK ACTIVITIES OR YOUR RELATIONSHIPS WITH OTHERS): 0 - NEVER BOTHERED

## 2025-02-14 ASSESSMENT — ITCH NUMERIC RATING SCALE: HOW SEVERE IS YOUR ITCHING?: 0

## 2025-02-14 ASSESSMENT — DERMATOLOGY PATIENT ASSESSMENT
DO YOU USE A TANNING BED: NO
HAVE YOU HAD OR DO YOU HAVE A STAPH INFECTION: NO
DO YOU HAVE ANY NEW OR CHANGING LESIONS: NO
HAVE YOU HAD OR DO YOU HAVE VASCULAR DISEASE: NO
DO YOU USE SUNSCREEN: OCCASIONALLY
ARE YOU AN ORGAN TRANSPLANT RECIPIENT: NO

## 2025-02-14 ASSESSMENT — PATIENT GLOBAL ASSESSMENT (PGA): PATIENT GLOBAL ASSESSMENT: PATIENT GLOBAL ASSESSMENT:  1 - CLEAR

## 2025-02-14 NOTE — PROGRESS NOTES
Subjective     Zacarias Holbrook is a 70 y.o. male who presents for the following: Skin Check (H/o Efudex treatment, prefers versus LN2. He did disclose he will be going to [place] , will be there for days. No concerns per Zacarias.).     LOV with derm 2022 with Dr. Zapata.   H/o Actinic keratoses   H/o basal cell carcinoma right lateral cheeck s/p mohs 2016  H/o seborrheic dermatitis  FMH of unknown skin cancer in father    Skin Cancer History  No skin cancer on file.    Review of Systems:  No other skin or systemic complaints other than what is documented elsewhere in the note.    The following portions of the chart were reviewed this encounter and updated as appropriate:       Specialty Problems          Dermatology Problems    Recurrent basal cell carcinoma     Formatting of this note might be different from the original. Right cheek         Multiple nevi    Callus of foot     Past Medical History:  Zacarias Holbrook  has a past medical history of Acute left-sided low back pain without sciatica (07/14/2023), Colicky LLQ abdominal pain (07/14/2023), Constipation (07/14/2023), Contact dermatitis (07/14/2023), IFG (impaired fasting glucose) (07/14/2023), Ileitis, terminal (Multi) (07/14/2023), Pain in unspecified shoulder (04/17/2019), Strain of unspecified muscle, fascia and tendon at shoulder and upper arm level, left arm, initial encounter (06/04/2019), and Unspecified abdominal pain (10/15/2019).    Past Surgical History:  Zacarias Holbrook  has a past surgical history that includes Other surgical history (10/26/2018); Other surgical history (10/26/2018); Tonsillectomy (10/26/2018); Circumcision, primary (1957); Eye surgery (1964); and [place] tooth extraction (1970).    Family History:  Patient family history includes Atrial fibrillation in his father; Hearing loss in his father.    Social History:  Zacarias Holbrook  reports that he has never smoked. He has never used smokeless tobacco. He reports current alcohol use of about 3.0  standard drinks of alcohol per week. He reports that he does not use drugs.    Allergies:  Patient has no known allergies.    Current Medications / CAM's:    Current Outpatient Medications:     atorvastatin (Lipitor) 20 mg tablet, Take 1 tablet (20 mg) by mouth once daily., Disp: 100 tablet, Rfl: 1    fluorouracil (Efudex) 5 % cream cream, Apply twice daily to affected areas on face for up to 3 weeks, stop when scabs., Disp: 60 g, Rfl: 2    tadalafil 20 mg tablet, Take 1 tablet (20 mg) by mouth once daily as needed for erectile dysfunction., Disp: 30 tablet, Rfl: 11     Objective   Well appearing patient in no apparent distress; mood and affect are within normal limits.    A full examination was performed including scalp, head, eyes, ears, nose, lips, neck, chest, axillae, abdomen, back, buttocks, bilateral upper extremities, bilateral lower extremities, hands, feet, fingers, toes, fingernails, and toenails. Patient declined genital and gluteal cleft exam.  All findings within normal limits unless otherwise noted below.    - scattered regular brown macules and papules    - Scattered waxy tan/grey/brown papules with horn cysts    - scattered small bright red papules and macules    Left Lower Leg - Anterior  Pink telangiectatic papule              Head - Anterior (Face)  Erythematous macules with gritty scale.         Assessment/Plan   Neoplasm of uncertain behavior of skin  Left Lower Leg - Anterior    Lesion biopsy  Type of biopsy: tangential    Informed consent: discussed and consent obtained    Timeout: patient name, date of birth, surgical site, and procedure verified    Procedure prep:  Patient was prepped and draped  Anesthesia: the lesion was anesthetized in a standard fashion    Anesthetic:  1% lidocaine w/ epinephrine 1-100,000 local infiltration  Instrument used: DermaBlade    Hemostasis achieved with: aluminum chloride    Outcome: patient tolerated procedure well    Post-procedure details: sterile dressing  applied and wound care instructions given    Dressing type: petrolatum and bandage      Staff Communication: Dermatology Local Anesthesia: 1 % Lidocaine / Epinephrine - Amount:1mL    Specimen 1 - Dermatopathology- DERM LAB  Differential Diagnosis: BCC  Check Margins Yes/No?:    Comments:    Dermpath Lab: Routine Histopathology (formalin-fixed tissue)    Actinic keratosis  Head - Anterior (Face)    - The premalignant nature of the disorder was reviewed and treatment options were reviewed.   - - Start 5-fluorouracil (efudex) treatment. Apply thin layer twice daily to the forehead for 3 weeks or until reach the endpiont of bright red color and early scab formation, whichever comes first. Second cycle will be for cheeks. The benefit of this chemotherapy cream treatment is to treat pre-cancerous areas and areas with early or superficial skin cancers. Risks include allergic or sensitivity reaction, local skin changes including redness, crusts, pain, itch, healing with discoloration, and need for repeat treatments/additional treatments. Strict sun protection is required during treatment and until healed. Rarely, scars can occur in cases of over-treatment.      He is familiar with efudex and comfortable treating. Handout on treatment given     fluorouracil (Efudex) 5 % cream cream - Head - Anterior (Face)  Apply twice daily to affected areas on face for up to 3 weeks, stop when scabs.    Multiple benign melanocytic nevi    Benign melanocytic nevi  - Discussed benign nature and that no treatment is necessary unless it becomes painful or increases in size. Patient opts for clinical monitoring at this time.    - Sun protective behavior reviewed and encouraged including the use of over-the-counter sunscreen with SPF30+ daily (reapply every 1.5 hours when outdoors), UPF clothing, broad rimmed hats, sunglasses, and avoidance of midday sun. Home skin monitoring encouraged and how to monitor for skin cancer (changing or new moles,  new rapidly growing or non-healing lesions) reviewed. Patient encouraged to call with interval concerns or changes.      Seborrheic keratoses    Seborrheic keratosis (-es)  - Discussed benign nature and that no treatment is necessary unless it becomes painful or increases in size. Patient opts for clinical monitoring at this time.      Cherry angioma    Cherry angioma(s)  - Discussed benign nature and that no treatment is necessary unless it becomes painful or increases in size. Patient opts for clinical monitoring at this time.      Skin cancer screening       1 year fbse, sooner pending biopsy results  [facility] [place]    Sade Colorado MD

## 2025-02-18 LAB
LABORATORY COMMENT REPORT: NORMAL
PATH REPORT.FINAL DX SPEC: NORMAL
PATH REPORT.GROSS SPEC: NORMAL
PATH REPORT.MICROSCOPIC SPEC OTHER STN: NORMAL
PATH REPORT.RELEVANT HX SPEC: NORMAL
PATH REPORT.TOTAL CANCER: NORMAL

## 2025-02-20 DIAGNOSIS — C44.719 BASAL CELL CARCINOMA (BCC) OF SKIN OF LEFT LOWER EXTREMITY INCLUDING HIP: Primary | ICD-10-CM

## 2025-02-26 ENCOUNTER — TELEPHONE (OUTPATIENT)
Dept: CARDIOLOGY | Facility: CLINIC | Age: 70
End: 2025-02-26
Payer: MEDICARE

## 2025-02-26 NOTE — TELEPHONE ENCOUNTER
----- Message from Carl Gillombardo sent at 2/26/2025  8:34 AM EST -----  Please let the patient know that his CT scan showed that his mildly dilated aorta is stable in size, and that we will not have to repeat this study for a couple years.

## 2025-02-26 NOTE — TELEPHONE ENCOUNTER
Result Communication    Resulted Orders   CT angio chest w and wo IV contrast    Narrative    Interpreted By:  Fuentes Nuñez,   STUDY:  CT ANGIO CHEST W AND WO IV CONTRAST;  2/11/2025 9:15 am      INDICATION:  Signs/Symptoms:mildly dilated ascending aorta.      ,I71.21 Aneurysm of the ascending aorta, without rupture (CMS-HCC)      COMPARISON:  None.      ACCESSION NUMBER(S):  [id number]      ORDERING CLINICIAN:  CARL GILLOMBARDO      TECHNIQUE:  Using multi-detector CT technology, axial, sequential imaging with  prospective gating was performed of the chest following the  intravenous administration of 68 ML Omnipaque 350.      For optimization of anatomic evaluation, multiplanar reconstruction,  maximum intensity projections, and advanced 3-D off-line  postprocessing were performed on a dedicated stand-alone workstation  by the interpreting physician.      FINDINGS:  Potential study limitations:  None.      THORACIC AORTA:  The thoracic aorta is normal in course, caliber, and contour.  The sinotubular junction is  preserved.  There is no evidence for acute aortic pathology, such as dissection,  intramural hematoma, or contained rupture. The arch vessel branching  pattern is  conventional.  All of the arch branch vessels appear  widely patent in their proximal portions. Visualized proximal  abdominal aorta is normal. The celiac trunk, SMA and bilateral renal  arteries are normal in caliber.      REPRESENTATIVE MEASUREMENTS OF THE AORTA:  Annulus  1.9 x  3.1 cm  Root (Sinus of Valsalva)  4.2 x 4.5 cm  Sinotubular junction  3.8 cm  Mid ascending  4.1 x 4.1 cm      Mid descending  3.1 cm          CORONARY ARTERIES:  The examination is not optimized for assessment of the coronary  arteries. Normal coronary artery origins.  Right dominant system.  There is moderate coronary artery calcifications in the LAD.      PULMONARY ARTERIES:  The central pulmonary arteries appear  normal (MPA-   cm, RPA-   cm,  LPA-  cm).       SYSTEMIC AND PULMONARY VEINS:  Normal systemic venous and pulmonary venous return.  The SVC and IVC are of normal caliber.  Normal pulmonary venous anatomy.      CARDIAC CHAMBERS:  Normal atrioventricular and ventriculoarterial concordance.      LEFT ATRIUM:  Normal size  RIGHT ATRIUM:  Normal size  INTERATRIAL SEPTUM:  Intact.      LEFT VENTRICLE:  Normal size 4.4 cm  RIGHT VENTRICLE:  Normal size )      INTERVENTRICULAR SEPTUM:  Intact.      AORTIC VALVE:  The aortic valve is  trileaflet in morphology.  No calcifications.      MITRAL VALVE:  No thickening/calcification.      PERICARDIUM:  There is no pericardial effusion or thickening.      CHEST:  Trachea and central airways are patent. No endobronchial lesion.  There is no focal pulmonary mass or consolidation. No pleural  effusion or pneumothorax. There is a benign-appearing 2 mm left lower  lobe parenchymal lung nodule. There is no significant axillary or  mediastinal lymph nodes. No hilar adenopathy. Visualized thyroid is  intact. Esophagus is normal.      UPPER ABDOMEN:  Limited imaging through the upper abdomen reveals no abnormalities of  the visualized organs. There is moderate atherosclerotic changes of  the abdominal aorta.      BONE AND SOFT TISSUE:  No suspicious osseous abnormality.        Impression    1.  There is stable mild thoracic aortic dilatation. There is mild  aortic root ectasia. The aortic valve is tricuspid.  2. Recommend a 2 year contrasted chest CT or MRI for surveillance.  3. Moderate atherosclerotic calcifications of the LAD and descending  thoracic aorta..          MACRO:  None      Signed by: Fuentes Nuñez 2/11/2025 10:00 AM  Dictation workstation:   [id number]       9:25 AM    Phoned and spoke to patient.  Provided patient results and plan of care per Dr. Gillombardo's notation and patient verbalized understanding.

## 2025-03-26 ENCOUNTER — APPOINTMENT (OUTPATIENT)
Dept: DERMATOLOGY | Facility: CLINIC | Age: 70
End: 2025-03-26
Payer: MEDICARE

## 2025-03-31 ENCOUNTER — DOCUMENTATION (OUTPATIENT)
Dept: PHYSICAL THERAPY | Facility: CLINIC | Age: 70
End: 2025-03-31
Payer: MEDICARE

## 2025-03-31 NOTE — PROGRESS NOTES
Physical Therapy    Discharge Summary    Name: Zacarias Holbrook  MRN: [id number]  : 1955  Date: 25    Discharge Summary: PT and OT    Discharge Information: Date of discharge 3-31-25 and Date of last visit 10-7-24    Therapy Summary: Pt has been discharged from Physical Therapy Services. Please see last treatment note for details.      Rehab Discharge Reason: Failed to schedule and/or keep follow-up appointment(s)

## 2025-04-11 ENCOUNTER — APPOINTMENT (OUTPATIENT)
Dept: DERMATOLOGY | Facility: CLINIC | Age: 70
End: 2025-04-11
Payer: MEDICARE

## 2025-04-11 VITALS — SYSTOLIC BLOOD PRESSURE: 124 MMHG | DIASTOLIC BLOOD PRESSURE: 80 MMHG | HEART RATE: 61 BPM

## 2025-04-11 DIAGNOSIS — C44.719 BASAL CELL CARCINOMA (BCC) OF SKIN OF LEFT LOWER EXTREMITY INCLUDING HIP: ICD-10-CM

## 2025-04-11 PROCEDURE — 99214 OFFICE O/P EST MOD 30 MIN: CPT | Performed by: DERMATOLOGY

## 2025-04-11 PROCEDURE — 17313 MOHS 1 STAGE T/A/L: CPT | Performed by: DERMATOLOGY

## 2025-04-11 NOTE — PROGRESS NOTES
Mohs Surgery Operative Note    Date of Surgery:  4/11/2025  Surgeon:  Maci Berrios MD  Office Location: [address] [place] [address] [place] [id number]  Dept: [phone]  Dept Fax: [phone]  Referring Provider: Sade Colorado MD  [id number]  [place] [address]      Assessment/Plan   Pre-procedure:   Obtained informed consent: written from patient  The surgical site was identified and confirmed with the patient.     Intra-operative:   Audible time out called at : 8:15 AM 04/11/25  by: Sangita Ruiz RN   Verified patient name, birthdate, site, specimen bottle label & requisition.    The planned procedure(s) was again reviewed with the patient. The risks of bleeding, infection, nerve damage and scarring were reviewed. Written authorization was obtained. The patient identity, surgical site, and planned procedure(s) were verified. The provider acted as both surgeon and pathologist.     Basal cell carcinoma (BCC) of skin of left lower extremity including hip  Left Lower Leg - Anterior  Mohs surgery  Consent obtained: written    Universal Protocol:  Procedure explained and questions answered to patient or proxy's satisfaction: Yes    Test results available and properly labeled: Yes    Pathology report reviewed: Yes    External notes reviewed: Yes    Photo or diagram used for site identification: Yes    Site/side marked: Yes    Slide independently reviewed by Mohs surgeon: Yes    Immediately prior to procedure a time out was called: Yes    Patient identity confirmed: verbally with patient  Preparation: Patient was prepped and draped in usual sterile fashion      Anticoagulation:  Is the patient taking prescription anticoagulant and/or aspirin prescribed/recommended by a physician? No    Was the anticoagulation regimen changed prior to Mohs? No      Anesthesia:  Anesthesia method: local infiltration  Local anesthetic: lidocaine 1% WITH  epi    Procedure Details:  Case ID Number: [id number]  Biopsy accession number: [id number]  Date of biopsy: 2/14/2025  Pre-Op diagnosis: basal cell carcinoma  BCC subtype: infiltrative and nodular  Surgical site (from skin exam): Left Lower Leg - Anterior  Pre-operative length (cm): 1.2  Pre-operative width (cm): 0.8  Indications for Mohs surgery: anatomic location where tissue conservation is critical  Previously treated? No      Micrographic Surgery Details:  Post-operative length (cm): 1.3  Post-operative width (cm): 1.1  Number of Mohs stages: 1    Stage 1  Comments: The patient was brought into the operating room and placed in the procedure chair in the appropriate position.  The area positive by previous biopsy was identified and confirmed with the patient. The area of clinically obvious tumor was debulked using a curette and/or scalpel as needed. An incision was made following the Mohs approach through the skin. The specimen was taken to the lab, divided into 2 piece(s) and appropriately chromacoded and processed.  Tumor features identified on Mohs section: no tumor identified  Depth of defect: subcutaneous fat    Patient tolerance of procedure: tolerated well, no immediate complications    Reconstruction:  Was the defect reconstructed?: No   Repair: After a discussion with the patient regarding the options for wound closure, a decision was made to proceed with second intention healing.  Dressing/Follow-up: Surgifoam was placed in the wound. A pressure dressing was placed to help stabilize the wound and to minimize the risk of postoperative bleeding. Wound care was discussed, and the patient was given written post-operative wound care instructions.  Hemostasis achieved with: electrodesiccation  Outcome: patient tolerated procedure well with no complications    Post-procedure details: sterile dressing applied and wound care instructions given    Dressing type: pressure dressing and Ace wrap            Wound care  was discussed, and the patient was given written post-operative wound care instructions.      The patient will follow up with Maci Berrios MD as needed for any post operative problems or concerns, and will follow up with their primary dermatologist as scheduled.

## 2025-04-11 NOTE — PROGRESS NOTES
Office Visit Note  Date: 4/11/2025  Surgeon:  Maci Berrios MD  Office Location: [address]   [place]  [facility] [address]  Dept: [phone]  Dept Fax: [phone]  Referring Provider: Sade Colorado MD  [id number]  [place] [address]    Alesha Holbrook is a 70 y.o. male who presents for the following: MOHS Surgery (Left Lower Leg-Anterior)    According to the patient, the lesion has been present for approximately 6 months at the time of diagnosis.  The lesion is not causing symptoms.  The lesion has not been treated previously.    The patient does not have a pacemaker / defibrillator.  The patient does not have a heart valve / joint replacement.    The patient is not on blood thinners.  The patient does not have a history of hepatitis B or C.  The patient does not have a history of HIV.  The patient does not have a history of immunosuppression (e.g. organ transplantation, malignancy, medications)    Review of Systems:  No other skin or systemic complaints other than what is documented elsewhere in the note.    MEDICAL HISTORY: clinically relevant history including significant past medical history, medications and allergies was reviewed and documented in Epic.    Objective   Well appearing patient in no apparent distress; mood and affect are within normal limits.  Vital signs: See record.  Noted on the Left Lower Leg - Anterior  Is a 1.2 x 0.8 cm scar    The patient confirmed the identified site.    Discussion:  The nature of the diagnosis was explained. The lesion is a skin cancer.  It has a risk of local growth and distant spread. The condition is associated with sun exposure.  Warning signs of non-melanoma skin cancer discussed. Patient was instructed to perform monthly self skin examination.  We recommended that the patient have regular full skin exams given an increased risk of subsequent skin cancers. The patient was  instructed to use sun protective behaviors including use of broad spectrum sunscreens and sun protective clothing to reduce risk of skin cancers.      Risks, benefits, side effects of Mohs surgery were discussed with patient and the patient voiced understanding.  It was explained that even though the cure rate of Mohs is very high it is not 100%. Risks of surgery including but not limited to bleeding, infection, numbness, nerve damage, and scar were reviewed.  Discussion included wound care requirements, activity restrictions, likely scar outcome and time to heal.     After Mohs surgery, the defect may need to be repaired surgically and the scar may be longer than the original lesion.  Reconstruction options, risks, and benefits were reviewed including second intention healing, linear repair (4-1 ratio was explained), local flaps, skin grafts, cartilage grafts and interpolation flaps (the need for multiple surgeries was explained). Possible outcomes were reviewed including likely scar appearance, failure of flap survival, infection, bleeding and the need for revision surgery.     The pathology was reviewed, the photograph was reviewed, and the referring physician's note was reviewed.    Patient elected for Mohs surgery.

## 2025-04-16 ENCOUNTER — APPOINTMENT (OUTPATIENT)
Dept: DERMATOLOGY | Facility: CLINIC | Age: 70
End: 2025-04-16
Payer: MEDICARE

## 2025-05-05 ENCOUNTER — APPOINTMENT (OUTPATIENT)
Dept: CARDIOLOGY | Facility: CLINIC | Age: 70
End: 2025-05-05
Payer: MEDICARE

## 2025-05-19 ENCOUNTER — APPOINTMENT (OUTPATIENT)
Dept: CARDIOLOGY | Facility: CLINIC | Age: 70
End: 2025-05-19
Payer: MEDICARE

## 2025-05-19 ENCOUNTER — APPOINTMENT (OUTPATIENT)
Dept: CARDIOLOGY | Facility: HOSPITAL | Age: 70
End: 2025-05-19
Payer: MEDICARE

## 2025-05-27 ENCOUNTER — OFFICE VISIT (OUTPATIENT)
Dept: PRIMARY CARE | Facility: CLINIC | Age: 70
End: 2025-05-27
Payer: MEDICARE

## 2025-05-27 VITALS
OXYGEN SATURATION: 98 % | RESPIRATION RATE: 12 BRPM | HEIGHT: 70 IN | WEIGHT: 170 LBS | TEMPERATURE: 96.8 F | DIASTOLIC BLOOD PRESSURE: 78 MMHG | BODY MASS INDEX: 24.34 KG/M2 | SYSTOLIC BLOOD PRESSURE: 124 MMHG | HEART RATE: 76 BPM

## 2025-05-27 DIAGNOSIS — M54.50 CHRONIC LEFT-SIDED LOW BACK PAIN WITHOUT SCIATICA: ICD-10-CM

## 2025-05-27 DIAGNOSIS — G89.29 CHRONIC LEFT-SIDED LOW BACK PAIN WITHOUT SCIATICA: ICD-10-CM

## 2025-05-27 DIAGNOSIS — H69.93 EUSTACHIAN TUBE DYSFUNCTION, BILATERAL: ICD-10-CM

## 2025-05-27 DIAGNOSIS — J01.90 ACUTE NON-RECURRENT SINUSITIS, UNSPECIFIED LOCATION: Primary | ICD-10-CM

## 2025-05-27 PROCEDURE — 3008F BODY MASS INDEX DOCD: CPT | Performed by: FAMILY MEDICINE

## 2025-05-27 PROCEDURE — 99213 OFFICE O/P EST LOW 20 MIN: CPT | Performed by: FAMILY MEDICINE

## 2025-05-27 PROCEDURE — 1036F TOBACCO NON-USER: CPT | Performed by: FAMILY MEDICINE

## 2025-05-27 PROCEDURE — 1159F MED LIST DOCD IN RCRD: CPT | Performed by: FAMILY MEDICINE

## 2025-05-27 RX ORDER — AMOXICILLIN AND CLAVULANATE POTASSIUM 875; 125 MG/1; MG/1
875 TABLET, FILM COATED ORAL 2 TIMES DAILY
Qty: 20 TABLET | Refills: 0 | Status: SHIPPED | OUTPATIENT
Start: 2025-05-27 | End: 2025-06-06

## 2025-05-27 ASSESSMENT — ENCOUNTER SYMPTOMS
ARTHRALGIAS: 0
SORE THROAT: 0
DIZZINESS: 0
APPETITE CHANGE: 0
ADENOPATHY: 0
SINUS PRESSURE: 1
BRUISES/BLEEDS EASILY: 0
DIFFICULTY URINATING: 0
COUGH: 0
ABDOMINAL DISTENTION: 0
EYE REDNESS: 0
EYE PAIN: 0
WEAKNESS: 0
ABDOMINAL PAIN: 0
FEVER: 0
SHORTNESS OF BREATH: 0
NERVOUS/ANXIOUS: 0
BLOOD IN STOOL: 0
DYSURIA: 0
HEADACHES: 0
SINUS COMPLAINT: 1
CONSTIPATION: 0
FATIGUE: 0
BACK PAIN: 1
DIARRHEA: 0
DYSPHORIC MOOD: 0
CHEST TIGHTNESS: 0
CHILLS: 0

## 2025-05-27 NOTE — PROGRESS NOTES
"Subjective   Patient ID: Zacarias Holbrook is a 70 y.o. male who presents for Sinus Problem.    HPI     Review of Systems    Objective   /78   Pulse 76   Temp 36 °C (96.8 °F) (Temporal)   Resp 12   Ht 1.778 m (5' 10\")   Wt 77.1 kg (170 lb)   SpO2 98%   BMI 24.39 kg/m²     Physical Exam    Assessment/Plan          "

## 2025-05-27 NOTE — PROGRESS NOTES
"Subjective   Patient ID: Zacarias Holbrook is a 70 y.o. male who presents for Sinus Problem.  Pt has bitemporal HA, sinus congestion and ear pressure, muffled and popping . Onset was 1.5 weeks ago.  Pt reports symptoms are worsening.   It worsens with moving head.  It occurs with frequency of daily and is constant .   Pt has tried Sudafed for treatment without much improvement.     Sinus Problem  Associated symptoms include congestion and sinus pressure. Pertinent negatives include no chills, coughing, headaches, shortness of breath or sore throat.       Review of Systems   Constitutional:  Negative for appetite change, chills, fatigue and fever.   HENT:  Positive for congestion, hearing loss and sinus pressure. Negative for sore throat.    Eyes:  Negative for pain, redness and visual disturbance.   Respiratory:  Negative for cough, chest tightness and shortness of breath.    Cardiovascular:  Negative for chest pain and leg swelling.   Gastrointestinal:  Negative for abdominal distention, abdominal pain, blood in stool, constipation and diarrhea.   Genitourinary:  Negative for difficulty urinating and dysuria.   Musculoskeletal:  Positive for back pain (due to L5-S1 DDD, doing home exercise following PT with some relief). Negative for arthralgias.   Skin:  Negative for rash.   Neurological:  Negative for dizziness, weakness and headaches.   Hematological:  Negative for adenopathy. Does not bruise/bleed easily.   Psychiatric/Behavioral:  Negative for dysphoric mood. The patient is not nervous/anxious.        Objective   /78   Pulse 76   Temp 36 °C (96.8 °F) (Temporal)   Resp 12   Ht 1.778 m (5' 10\")   Wt 77.1 kg (170 lb)   SpO2 98%   BMI 24.39 kg/m²    Physical Exam  Constitutional:       General: He is not in acute distress.     Appearance: Normal appearance.   HENT:      Head: Normocephalic and atraumatic.      Right Ear: No middle ear effusion. There is no impacted cerumen. Tympanic membrane is retracted. " Tympanic membrane is not perforated.      Left Ear:  No middle ear effusion. There is no impacted cerumen. Tympanic membrane is retracted. Tympanic membrane is not perforated.      Nose:      Right Turbinates: Enlarged.      Left Turbinates: Enlarged.      Right Sinus: No maxillary sinus tenderness or frontal sinus tenderness.      Left Sinus: No maxillary sinus tenderness or frontal sinus tenderness.      Mouth/Throat:      Pharynx: Posterior oropharyngeal erythema present.   Cardiovascular:      Rate and Rhythm: Normal rate and regular rhythm.      Heart sounds: Normal heart sounds. No murmur heard.  Pulmonary:      Effort: Pulmonary effort is normal.      Breath sounds: Normal breath sounds.   Abdominal:      Palpations: Abdomen is soft.      Tenderness: There is no abdominal tenderness.   Neurological:      Mental Status: He is alert.   Psychiatric:         Mood and Affect: Mood normal.         Judgment: Judgment normal.           Assessment/Plan   Diagnoses and all orders for this visit:  Acute non-recurrent sinusitis/Eustachian tube dysfunction, bilateral -  with duration/severity of symptoms will treat with  Augmentin 10days. Recommend rest, increased fluids, nasal saline at least 3x daily (Ie. Sinus Rinse), salt water gargles, and Tylenol as needed.   Chronic left-sided low back pain without sciatica - continue home exercise, would consider referral to PMR if not improving.    Follow up as planned

## 2025-06-06 ENCOUNTER — PATIENT MESSAGE (OUTPATIENT)
Dept: PRIMARY CARE | Facility: CLINIC | Age: 70
End: 2025-06-06
Payer: MEDICARE

## 2025-06-06 DIAGNOSIS — H69.93 DYSFUNCTION OF BOTH EUSTACHIAN TUBES: Primary | ICD-10-CM

## 2025-06-09 ENCOUNTER — OFFICE VISIT (OUTPATIENT)
Dept: OTOLARYNGOLOGY | Facility: CLINIC | Age: 70
End: 2025-06-09
Payer: MEDICARE

## 2025-06-09 VITALS
HEIGHT: 70 IN | WEIGHT: 175 LBS | BODY MASS INDEX: 25.05 KG/M2 | DIASTOLIC BLOOD PRESSURE: 83 MMHG | SYSTOLIC BLOOD PRESSURE: 155 MMHG | HEART RATE: 69 BPM

## 2025-06-09 DIAGNOSIS — J34.829 NASAL VALVE COLLAPSE: ICD-10-CM

## 2025-06-09 DIAGNOSIS — J30.9 ALLERGIC RHINITIS, UNSPECIFIED SEASONALITY, UNSPECIFIED TRIGGER: Primary | ICD-10-CM

## 2025-06-09 DIAGNOSIS — H69.93 DYSFUNCTION OF BOTH EUSTACHIAN TUBES: ICD-10-CM

## 2025-06-09 DIAGNOSIS — J01.00 ACUTE NON-RECURRENT MAXILLARY SINUSITIS: ICD-10-CM

## 2025-06-09 DIAGNOSIS — R06.89 DIFFICULTY BREATHING: ICD-10-CM

## 2025-06-09 DIAGNOSIS — J34.3 HYPERTROPHY OF NASAL TURBINATES: ICD-10-CM

## 2025-06-09 DIAGNOSIS — J34.2 DEVIATED SEPTUM: ICD-10-CM

## 2025-06-09 PROCEDURE — 3008F BODY MASS INDEX DOCD: CPT | Performed by: PHYSICIAN ASSISTANT

## 2025-06-09 PROCEDURE — 87075 CULTR BACTERIA EXCEPT BLOOD: CPT

## 2025-06-09 PROCEDURE — 87070 CULTURE OTHR SPECIMN AEROBIC: CPT

## 2025-06-09 PROCEDURE — 87205 SMEAR GRAM STAIN: CPT

## 2025-06-09 PROCEDURE — 99204 OFFICE O/P NEW MOD 45 MIN: CPT | Performed by: PHYSICIAN ASSISTANT

## 2025-06-09 PROCEDURE — 31231 NASAL ENDOSCOPY DX: CPT | Performed by: PHYSICIAN ASSISTANT

## 2025-06-09 PROCEDURE — 1159F MED LIST DOCD IN RCRD: CPT | Performed by: PHYSICIAN ASSISTANT

## 2025-06-09 PROCEDURE — 1036F TOBACCO NON-USER: CPT | Performed by: PHYSICIAN ASSISTANT

## 2025-06-09 RX ORDER — OXYMETAZOLINE HYDROCHLORIDE 0.05 G/100ML
SPRAY, METERED NASAL
Qty: 60 ML | Refills: 0 | Status: SHIPPED | OUTPATIENT
Start: 2025-06-09

## 2025-06-09 RX ORDER — LORATADINE 10 MG/1
10 TABLET ORAL NIGHTLY
Qty: 30 TABLET | Refills: 11 | Status: SHIPPED | OUTPATIENT
Start: 2025-06-09 | End: 2026-06-09

## 2025-06-09 RX ORDER — DOXYCYCLINE 100 MG/1
100 CAPSULE ORAL 2 TIMES DAILY
Qty: 28 CAPSULE | Refills: 0 | Status: SHIPPED | OUTPATIENT
Start: 2025-06-09 | End: 2025-06-23

## 2025-06-09 RX ORDER — FLUTICASONE PROPIONATE 50 MCG
2 SPRAY, SUSPENSION (ML) NASAL 2 TIMES DAILY
Qty: 16 G | Refills: 11 | Status: SHIPPED | OUTPATIENT
Start: 2025-06-09 | End: 2026-06-09

## 2025-06-09 NOTE — PROGRESS NOTES
Facial Plastic & Reconstructive Surgery    Referring Provider: Henrry Womack MD  CC: Henrry Womack MD    Chief complaint: Acute sinus infection/Nasal obstruction    HPI     Drew Mogee is a pleasant 70 y.o. male who presents in consultation for the evaluation of nasal obstruction.    The patient reports obstructed breathing, which is constant, present year round, does not fluctuate. It affects the patients ability to sleep, exercise, and is troubling during the day.   Symptoms began over three months ago.  The patient has used nasal steroid sprays for greater than 8 weeks without any benefit. The patient has trailed nasal cones/breathe right strips.      Site of obstruction:  right  Previous surgery: Denies  Trauma history: none  Allergic rhinitis, sinusitis history: denies  Smoking: none   TRACI: denies    Aesthetic concern:   No    History obtained from: patient    Past Medical History  He has a past medical history of Acute left-sided low back pain without sciatica (07/14/2023), Colicky LLQ abdominal pain (07/14/2023), Constipation (07/14/2023), Contact dermatitis (07/14/2023), IFG (impaired fasting glucose) (07/14/2023), Ileitis, terminal (Multi) (07/14/2023), Pain in unspecified shoulder (04/17/2019), Strain of unspecified muscle, fascia and tendon at shoulder and upper arm level, left arm, initial encounter (06/04/2019), and Unspecified abdominal pain (10/15/2019).    Past Surgical History  He has a past surgical history that includes Other surgical history (10/26/2018); Other surgical history (10/26/2018); Tonsillectomy (10/26/2018); Circumcision, primary (1957); Eye surgery (1964); and [place] tooth extraction (1970).     Social History  He reports that he has never smoked. He has never used smokeless tobacco. He reports current alcohol use of about 3.0 standard drinks of alcohol per week. He reports that he does not use drugs.    Review of Systems:    11 point ROS was performed.  Pertinent positives are for above complaint.  All other systems were negative.     Family History  Family History[1]     Allergies  Patient has no known allergies.    Physical exam    There were no vitals filed for this visit.    General: No acute distress or pain   Head: Normal cephalic, atraumatic  Eyes: EOMI; PERRL; Normal lids, sclera, conjunctiva, and cornea; No epiphora; No nystagmus   Ears: Normal pinnae without any external deformities  Nose and Sinuses:  See Endoscopy procedure note below for findings  Oral Cavity: Good dentition; Normal gums, tongue, floor of the mouth, retromolar trigone, buccal mucosa, hard palate are normal; Tonsillar fossae, palate, and uvula are normal without lesions or masses; Symmetric palatal elevation; FOM is soft; Tongue is midline upon protrusion without restriction, and without masses or lesions  Neck: Soft, supple; No lymphadenopathy; No thyromegaly or masses  Neuro: Cranial nerve 2-12 intact; Alert and oriented x3  Skin: Warm and well perfused; no lesions or rashes  Respiratory: Chest symmetric with bilateral expansion   Cardiac: No peripheral edema, no varicosities.     A comprehensive facial exam was performed with the following highlights:    Narrow airway bilaterally.  Deep inspiration /  Moderate inspiration produces dynamic collapse of the internal and external valves.   Modified Mills Maneuver: Performed with a cotton tipped applicator, markedly improves breathing bilaterally.     Nasal analysis:     Radiographic Studies: A CT scan of the sinuses performed. Outside records reviewed. Pertinent radiology and labs reviewed.     Intranasal endoscopic examination performed with limited view on anterior rhinoscopy.    Data reviewed:  no results available at this time    Procedures:    Procedure: Rigid diagnostic nasal endoscopy  Surgeon:  Yamil Salcido MD  Findings:  A 0-degree rigid endoscope was passed through the patient's bilateral naris via a 3 pass  maneuver.  The first pass was along the floor of the nose to the nasopharynx.  The second pass was to the area of the middle meatus.  The third pass was to the sphenoethmoidal recess.    Septum:  Deviation is towards the right with right bony spur posterior with nasal obstruction approximately 50% on the both side/sides, no perforations, synechia.   Internal Nasal Valve: Angle is reduced, narrowing the nasal airway bilaterally.    Right nasal cavity:      Inferior turbinate:  2+    Inferior meatus:  yellow drainage, cultured today, no polyps/masses/lesions    Middle meatus:  Clear, no discharge, no polyps/masses/lesions   Left nasal cavity:    Inferior turbinate:  2+    Inferior meatus:  Clear, no discharge, no polyps/masses/lesions    Middle meatus:  Clear, no discharge, no polyps/masses/lesions   Nasopharynx:  Clear, no discharge, no masses/lesions    Assessment/Plan:     Zacarias Holbrook is a pleasant 70 y.o. male presents with significant mechanical nasal obstruction. The cause is multifactorial, with an obvious septal deviation, turbinate hypertrophy, and static internal nasal valve collapse. 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 Correction of this nasal obstruction will require a septoplasty, repair of nasal valve collapse with structural grafting, bilateral turbinate reduction and out fracturing, possible harvest of septal/auricular/or rib cartilage or use of cadaveric rib cartilage. Additionally, the patient's cosmetic concerns were addressed as well. We discussed the medical necessity of this at length, as well as the risks and limitations of the procedures. All questions were answered.   -CT SINUS following 2 weeks of Doxycycline - will call if patient needs antibiotics changed based on culture  -Possible referral for sinus surgery depending on CT Scan results  -Flonase twice a day, 2 sprays UP and OUT, sniff back.    -Afrin 3 days on 4 days off for acute   -Claritin nightly for allergies  -  Omari also evaluated patient today and agrees with plan.         [1]   Family History  Problem Relation Name Age of Onset    Atrial fibrillation Father Edward     Hearing loss Father Edward

## 2025-06-09 NOTE — PATIENT INSTRUCTIONS
-CT SINUS following 2 weeks of Doxycycline - will call if patient needs antibiotics changed based on culture  -Possible referral for sinus surgery depending on CT Scan results  -Flonase twice a day, 2 sprays UP and OUT, sniff back.    -Afrin 3 days on 4 days off for acute   -Claritin nightly for allergies

## 2025-06-15 LAB
BACTERIA SPEC CULT: ABNORMAL
GRAM STN SPEC: ABNORMAL

## 2025-06-16 LAB
BACTERIA SPEC CULT: ABNORMAL
BACTERIA SPEC CULT: ABNORMAL
GRAM STN SPEC: ABNORMAL

## 2025-06-30 ENCOUNTER — HOSPITAL ENCOUNTER (OUTPATIENT)
Dept: RADIOLOGY | Facility: CLINIC | Age: 70
End: 2025-06-30
Payer: MEDICARE

## 2025-06-30 DIAGNOSIS — J34.829 NASAL VALVE COLLAPSE: ICD-10-CM

## 2025-06-30 DIAGNOSIS — J34.2 DEVIATED SEPTUM: ICD-10-CM

## 2025-06-30 DIAGNOSIS — J34.3 HYPERTROPHY OF NASAL TURBINATES: ICD-10-CM

## 2025-06-30 DIAGNOSIS — R06.89 DIFFICULTY BREATHING: ICD-10-CM

## 2025-06-30 DIAGNOSIS — H69.93 DYSFUNCTION OF BOTH EUSTACHIAN TUBES: ICD-10-CM

## 2025-06-30 PROCEDURE — 70486 CT MAXILLOFACIAL W/O DYE: CPT | Performed by: RADIOLOGY

## 2025-06-30 PROCEDURE — 70486 CT MAXILLOFACIAL W/O DYE: CPT

## 2025-07-09 ENCOUNTER — TELEMEDICINE (OUTPATIENT)
Facility: CLINIC | Age: 70
End: 2025-07-09
Payer: MEDICARE

## 2025-07-09 DIAGNOSIS — J34.2 DEVIATED SEPTUM: ICD-10-CM

## 2025-07-09 DIAGNOSIS — R06.89 DIFFICULTY BREATHING: ICD-10-CM

## 2025-07-09 DIAGNOSIS — J30.9 ALLERGIC RHINITIS, UNSPECIFIED SEASONALITY, UNSPECIFIED TRIGGER: ICD-10-CM

## 2025-07-09 DIAGNOSIS — J34.829 NASAL VALVE COLLAPSE: ICD-10-CM

## 2025-07-09 DIAGNOSIS — J01.00 ACUTE NON-RECURRENT MAXILLARY SINUSITIS: ICD-10-CM

## 2025-07-09 DIAGNOSIS — J34.3 HYPERTROPHY OF NASAL TURBINATES: Primary | ICD-10-CM

## 2025-07-09 PROCEDURE — 99214 OFFICE O/P EST MOD 30 MIN: CPT | Performed by: PHYSICIAN ASSISTANT

## 2025-07-09 NOTE — PROGRESS NOTES
Facial Plastic & Reconstructive Surgery    7/9/25  VV EST PT    Mr. Holbrook presents today via Virtual visit to discuss his nasal airway/sinus infection symptoms since our last visit on 6/9/25.  Plan at that time was:  -Nasal surgery + :  -CT SINUS following 2 weeks of Doxycycline - will call if patient needs antibiotics changed based on culture  -Possible referral for sinus surgery depending on CT Scan results  -Flonase twice a day, 2 sprays UP and OUT, sniff back.    -Afrin 3 days on 4 days off for acute   -Claritin nightly for allergies  -Dr. Salcido also evaluated patient today and agrees with plan.    Patient states he is feeling significantly better from a sinus standpoint following abx.    CT Sinus completed after doxy course and showed no sinus disease.    Taking claritin nightly and flonase.  Has not done Neilmed but willing to try out twice daily to help  Will try Breathe Right strips for nighttime nasal congestion.  Would like to try above to defer surgical intervention if possible for nasal airway.    Plan: Patient to contact me via [employer] Message in 1 week to give update on symptoms with Rinses and Breathe Right strips.    A majority of the work and / or time of this visit was conducted by means of a two-way synchronous audio-video connection, and technical limitations of providing care through this modality were explained to the patient. Patient has explicitly consented to this telehealth visit. I was with direct face-to-face time with the patient during this visit, and more than 50% of the time was spent in counseling and coordination of care. In addition.    Total time of direct and indirect care for this visit: 30        [place] PA-C   ---------------------------------  6/9/25  Referring Provider: No ref. provider found  CC: No ref. provider found    Chief complaint: Acute sinus infection/Nasal obstruction    HPI     Drew Mogee is a pleasant 70 y.o. male who presents in consultation  for the evaluation of nasal obstruction.    The patient reports obstructed breathing, which is constant, present year round, does not fluctuate. It affects the patients ability to sleep, exercise, and is troubling during the day.   Symptoms began over three months ago.  The patient has used nasal steroid sprays for greater than 8 weeks without any benefit. The patient has trailed nasal cones/breathe right strips.      Site of obstruction:  right  Previous surgery: Denies  Trauma history: none  Allergic rhinitis, sinusitis history: denies  Smoking: none   TRACI: denies    Aesthetic concern:   No    History obtained from: patient    Past Medical History  He has a past medical history of Acute left-sided low back pain without sciatica (07/14/2023), Colicky LLQ abdominal pain (07/14/2023), Constipation (07/14/2023), Contact dermatitis (07/14/2023), HL (hearing loss) (06/07/25), IFG (impaired fasting glucose) (07/14/2023), Ileitis, terminal (Multi) (07/14/2023), Pain in unspecified shoulder (04/17/2019), Strain of unspecified muscle, fascia and tendon at shoulder and upper arm level, left arm, initial encounter (06/04/2019), Tinnitus (2024), and Unspecified abdominal pain (10/15/2019).    Past Surgical History  He has a past surgical history that includes Other surgical history (10/26/2018); Other surgical history (10/26/2018); Tonsillectomy (10/26/2018); Circumcision, primary (1957); Eye surgery (1964); and McDougal tooth extraction (1970).     Social History  He reports that he has never smoked. He has never used smokeless tobacco. He reports current alcohol use of about 6.0 standard drinks of alcohol per week. He reports that he does not use drugs.    Review of Systems:    11 point ROS was performed. Pertinent positives are for above complaint.  All other systems were negative.     Family History  Family History[1]     Allergies  Patient has no known allergies.    Physical exam    There were no vitals filed for this  visit.    General: No acute distress or pain   Head: Normal cephalic, atraumatic  Eyes: EOMI; PERRL; Normal lids, sclera, conjunctiva, and cornea; No epiphora; No nystagmus   Ears: Normal pinnae without any external deformities  Nose and Sinuses:  See Endoscopy procedure note below for findings  Oral Cavity: Good dentition; Normal gums, tongue, floor of the mouth, retromolar trigone, buccal mucosa, hard palate are normal; Tonsillar fossae, palate, and uvula are normal without lesions or masses; Symmetric palatal elevation; FOM is soft; Tongue is midline upon protrusion without restriction, and without masses or lesions  Neck: Soft, supple; No lymphadenopathy; No thyromegaly or masses  Neuro: Cranial nerve 2-12 intact; Alert and oriented x3  Skin: Warm and well perfused; no lesions or rashes  Respiratory: Chest symmetric with bilateral expansion   Cardiac: No peripheral edema, no varicosities.     A comprehensive facial exam was performed with the following highlights:    Narrow airway bilaterally.  Deep inspiration /  Moderate inspiration produces dynamic collapse of the internal and external valves.   Modified Callie Maneuver: Performed with a cotton tipped applicator, markedly improves breathing bilaterally.     Nasal analysis:     Radiographic Studies: A CT scan of the sinuses performed. Outside records reviewed. Pertinent radiology and labs reviewed.     Intranasal endoscopic examination performed with limited view on anterior rhinoscopy.    Data reviewed:  no results available at this time    Procedures:    Procedure: Rigid diagnostic nasal endoscopy  Surgeon:  Yamil Salcido MD  Findings:  A 0-degree rigid endoscope was passed through the patient's bilateral naris via a 3 pass maneuver.  The first pass was along the floor of the nose to the nasopharynx.  The second pass was to the area of the middle meatus.  The third pass was to the sphenoethmoidal recess.    Septum:  Deviation is towards the right  with right bony spur posterior with nasal obstruction approximately 50% on the both side/sides, no perforations, synechia.   Internal Nasal Valve: Angle is reduced, narrowing the nasal airway bilaterally.    Right nasal cavity:      Inferior turbinate:  2+    Inferior meatus:  yellow drainage, cultured today, no polyps/masses/lesions    Middle meatus:  Clear, no discharge, no polyps/masses/lesions   Left nasal cavity:    Inferior turbinate:  2+    Inferior meatus:  Clear, no discharge, no polyps/masses/lesions    Middle meatus:  Clear, no discharge, no polyps/masses/lesions   Nasopharynx:  Clear, no discharge, no masses/lesions    Assessment/Plan:     Zacarias Holbrook is a pleasant 70 y.o. male presents with significant mechanical nasal obstruction. The cause is multifactorial, with an obvious septal deviation, turbinate hypertrophy, and static internal nasal valve collapse. 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 Correction of this nasal obstruction will require a septoplasty, repair of nasal valve collapse with structural grafting, bilateral turbinate reduction and out fracturing, possible harvest of septal/auricular/or rib cartilage or use of cadaveric rib cartilage. Additionally, the patient's cosmetic concerns were addressed as well. We discussed the medical necessity of this at length, as well as the risks and limitations of the procedures. All questions were answered.     -CT SINUS following 2 weeks of Doxycycline - will call if patient needs antibiotics changed based on culture  -Possible referral for sinus surgery depending on CT Scan results  -Flonase twice a day, 2 sprays UP and OUT, sniff back.    -Afrin 3 days on 4 days off for acute   -Claritin nightly for allergies  -Dr. Salcido also evaluated patient today and agrees with plan.         [1]   Family History  Problem Relation Name Age of Onset    Atrial fibrillation Father Edward     Hearing loss Father Edward     Basal cell carcinoma Father  Farhad     Squamous cell carcinoma Father Edward

## 2025-07-14 DIAGNOSIS — H90.3 SENSORINEURAL HEARING LOSS (SNHL) OF BOTH EARS: ICD-10-CM

## 2025-07-14 DIAGNOSIS — H69.93 DYSFUNCTION OF BOTH EUSTACHIAN TUBES: Primary | ICD-10-CM

## 2025-08-05 DIAGNOSIS — E78.5 DYSLIPIDEMIA: ICD-10-CM

## 2025-08-06 LAB
ALBUMIN SERPL-MCNC: 4.4 G/DL (ref 3.6–5.1)
ALP SERPL-CCNC: 70 U/L (ref 35–144)
ALT SERPL-CCNC: 15 U/L (ref 9–46)
ANION GAP SERPL CALCULATED.4IONS-SCNC: 9 MMOL/L (CALC) (ref 7–17)
AST SERPL-CCNC: 18 U/L (ref 10–35)
BILIRUB SERPL-MCNC: 0.7 MG/DL (ref 0.2–1.2)
BUN SERPL-MCNC: 18 MG/DL (ref 7–25)
CALCIUM SERPL-MCNC: 9.6 MG/DL (ref 8.6–10.3)
CHLORIDE SERPL-SCNC: 104 MMOL/L (ref 98–110)
CHOLEST SERPL-MCNC: 155 MG/DL
CHOLEST/HDLC SERPL: 2.4 (CALC)
CO2 SERPL-SCNC: 26 MMOL/L (ref 20–32)
CREAT SERPL-MCNC: 0.93 MG/DL (ref 0.7–1.35)
EGFRCR SERPLBLD CKD-EPI 2021: 89 ML/MIN/1.73M2
GLUCOSE SERPL-MCNC: 92 MG/DL (ref 65–99)
HDLC SERPL-MCNC: 64 MG/DL
LDLC SERPL CALC-MCNC: 73 MG/DL (CALC)
NONHDLC SERPL-MCNC: 91 MG/DL (CALC)
POTASSIUM SERPL-SCNC: 5 MMOL/L (ref 3.5–5.3)
PROT SERPL-MCNC: 6.2 G/DL (ref 6.1–8.1)
SODIUM SERPL-SCNC: 139 MMOL/L (ref 135–146)
TRIGL SERPL-MCNC: 94 MG/DL

## 2025-08-13 ENCOUNTER — APPOINTMENT (OUTPATIENT)
Dept: UROLOGY | Facility: CLINIC | Age: 70
End: 2025-08-13
Payer: MEDICARE

## 2025-08-13 VITALS — TEMPERATURE: 97.2 F

## 2025-08-13 DIAGNOSIS — N52.9 ERECTILE DYSFUNCTION, UNSPECIFIED ERECTILE DYSFUNCTION TYPE: ICD-10-CM

## 2025-08-13 DIAGNOSIS — N48.6 PEYRONIE DISEASE: Primary | ICD-10-CM

## 2025-08-13 DIAGNOSIS — R39.9 LOWER URINARY TRACT SYMPTOMS (LUTS): ICD-10-CM

## 2025-08-13 LAB
POC APPEARANCE, URINE: CLEAR
POC BILIRUBIN, URINE: ABNORMAL
POC BLOOD, URINE: NEGATIVE
POC COLOR, URINE: YELLOW
POC GLUCOSE, URINE: NEGATIVE MG/DL
POC KETONES, URINE: NEGATIVE MG/DL
POC LEUKOCYTES, URINE: NEGATIVE
POC NITRITE,URINE: NEGATIVE
POC PH, URINE: 5.5 PH
POC PROTEIN, URINE: NEGATIVE MG/DL
POC SPECIFIC GRAVITY, URINE: >=1.03
POC UROBILINOGEN, URINE: 0.2 EU/DL

## 2025-08-13 PROCEDURE — 1036F TOBACCO NON-USER: CPT | Performed by: NURSE PRACTITIONER

## 2025-08-13 PROCEDURE — 81003 URINALYSIS AUTO W/O SCOPE: CPT | Performed by: NURSE PRACTITIONER

## 2025-08-13 PROCEDURE — 99213 OFFICE O/P EST LOW 20 MIN: CPT | Performed by: NURSE PRACTITIONER

## 2025-08-13 PROCEDURE — 1126F AMNT PAIN NOTED NONE PRSNT: CPT | Performed by: NURSE PRACTITIONER

## 2025-08-13 PROCEDURE — 1159F MED LIST DOCD IN RCRD: CPT | Performed by: NURSE PRACTITIONER

## 2025-08-13 PROCEDURE — 1160F RVW MEDS BY RX/DR IN RCRD: CPT | Performed by: NURSE PRACTITIONER

## 2025-08-13 PROCEDURE — 51798 US URINE CAPACITY MEASURE: CPT | Performed by: NURSE PRACTITIONER

## 2025-08-13 PROCEDURE — G2211 COMPLEX E/M VISIT ADD ON: HCPCS | Performed by: NURSE PRACTITIONER

## 2025-08-13 RX ORDER — TADALAFIL 5 MG/1
5 TABLET ORAL DAILY
Qty: 90 TABLET | Refills: 3 | Status: SHIPPED | OUTPATIENT
Start: 2025-08-13 | End: 2026-08-08

## 2025-08-13 ASSESSMENT — PAIN SCALES - GENERAL: PAINLEVEL_OUTOF10: 0-NO PAIN

## 2025-08-14 ENCOUNTER — APPOINTMENT (OUTPATIENT)
Dept: PRIMARY CARE | Facility: CLINIC | Age: 70
End: 2025-08-14
Payer: MEDICARE

## 2025-08-14 ENCOUNTER — CLINICAL SUPPORT (OUTPATIENT)
Dept: AUDIOLOGY | Facility: CLINIC | Age: 70
End: 2025-08-14
Payer: MEDICARE

## 2025-08-14 VITALS
DIASTOLIC BLOOD PRESSURE: 82 MMHG | HEIGHT: 70 IN | OXYGEN SATURATION: 94 % | WEIGHT: 174.6 LBS | SYSTOLIC BLOOD PRESSURE: 124 MMHG | BODY MASS INDEX: 25 KG/M2 | HEART RATE: 79 BPM

## 2025-08-14 DIAGNOSIS — H93.13 TINNITUS, BILATERAL: ICD-10-CM

## 2025-08-14 DIAGNOSIS — K52.9 ILEITIS: ICD-10-CM

## 2025-08-14 DIAGNOSIS — J34.89 SINUS PRESSURE: ICD-10-CM

## 2025-08-14 DIAGNOSIS — R93.1 AGATSTON CAC SCORE 100-199: ICD-10-CM

## 2025-08-14 DIAGNOSIS — E78.5 DYSLIPIDEMIA: Primary | ICD-10-CM

## 2025-08-14 DIAGNOSIS — H93.8X3 FULLNESS IN EAR, BILATERAL: ICD-10-CM

## 2025-08-14 DIAGNOSIS — H90.3 SENSORINEURAL HEARING LOSS (SNHL) OF BOTH EARS: Primary | ICD-10-CM

## 2025-08-14 DIAGNOSIS — H91.90 HEARING LOSS, UNSPECIFIED HEARING LOSS TYPE, UNSPECIFIED LATERALITY: ICD-10-CM

## 2025-08-14 DIAGNOSIS — I71.21 ANEURYSM OF ASCENDING AORTA WITHOUT RUPTURE: ICD-10-CM

## 2025-08-14 DIAGNOSIS — Z13.89 SCREENING FOR BLOOD OR PROTEIN IN URINE: ICD-10-CM

## 2025-08-14 DIAGNOSIS — Z12.5 SCREENING PSA (PROSTATE SPECIFIC ANTIGEN): ICD-10-CM

## 2025-08-14 DIAGNOSIS — C44.91 RECURRENT BASAL CELL CARCINOMA: ICD-10-CM

## 2025-08-14 PROCEDURE — 1036F TOBACCO NON-USER: CPT | Performed by: FAMILY MEDICINE

## 2025-08-14 PROCEDURE — 1159F MED LIST DOCD IN RCRD: CPT | Performed by: FAMILY MEDICINE

## 2025-08-14 PROCEDURE — 92550 TYMPANOMETRY & REFLEX THRESH: CPT

## 2025-08-14 PROCEDURE — 92557 COMPREHENSIVE HEARING TEST: CPT

## 2025-08-14 PROCEDURE — 99213 OFFICE O/P EST LOW 20 MIN: CPT | Performed by: FAMILY MEDICINE

## 2025-08-14 PROCEDURE — 3008F BODY MASS INDEX DOCD: CPT | Performed by: FAMILY MEDICINE

## 2025-08-14 PROCEDURE — G2211 COMPLEX E/M VISIT ADD ON: HCPCS | Performed by: FAMILY MEDICINE

## 2025-08-14 ASSESSMENT — ENCOUNTER SYMPTOMS
DYSURIA: 0
EYE REDNESS: 0
SHORTNESS OF BREATH: 0
HEADACHES: 0
CHILLS: 0
APPETITE CHANGE: 0
DYSPHORIC MOOD: 0
BLOOD IN STOOL: 0
EYE PAIN: 0
BACK PAIN: 0
ARTHRALGIAS: 0
DIZZINESS: 0
NERVOUS/ANXIOUS: 0
BRUISES/BLEEDS EASILY: 0
ABDOMINAL PAIN: 0
CONSTIPATION: 0
FATIGUE: 0
DIARRHEA: 0
DIFFICULTY URINATING: 0
FEVER: 0
COUGH: 0
SORE THROAT: 0
CHEST TIGHTNESS: 0
ABDOMINAL DISTENTION: 0
ADENOPATHY: 0
WEAKNESS: 0

## 2025-08-14 ASSESSMENT — PAIN SCALES - GENERAL: PAINLEVEL_OUTOF10: 3

## 2025-08-14 ASSESSMENT — PAIN - FUNCTIONAL ASSESSMENT: PAIN_FUNCTIONAL_ASSESSMENT: 0-10

## 2026-02-16 ENCOUNTER — APPOINTMENT (OUTPATIENT)
Dept: PRIMARY CARE | Facility: CLINIC | Age: 71
End: 2026-02-16
Payer: MEDICARE

## 2026-08-13 ENCOUNTER — APPOINTMENT (OUTPATIENT)
Dept: UROLOGY | Facility: CLINIC | Age: 71
End: 2026-08-13
Payer: MEDICARE